# Patient Record
Sex: FEMALE | Race: BLACK OR AFRICAN AMERICAN | Employment: FULL TIME | URBAN - METROPOLITAN AREA
[De-identification: names, ages, dates, MRNs, and addresses within clinical notes are randomized per-mention and may not be internally consistent; named-entity substitution may affect disease eponyms.]

---

## 2017-10-31 ENCOUNTER — HOSPITAL ENCOUNTER (OUTPATIENT)
Dept: OTHER | Age: 42
Discharge: HOME | End: 2017-10-31
Attending: PREVENTIVE MEDICINE | Admitting: PREVENTIVE MEDICINE

## 2017-11-02 LAB
NIL (NEGATIVE) SPOT CONTROL: 1
PANEL A SPOT COUNT: 0
PANEL B SPOT COUNT: 0
POSITIVE CONTROL SPOT COUNT: >20
RUBEOLA (MEASLES) AB IGG: 27
TB CELL IMMUNE MEASURE: NEGATIVE

## 2018-04-18 ENCOUNTER — OFFICE VISIT (OUTPATIENT)
Dept: FAMILY MEDICINE CLINIC | Age: 43
End: 2018-04-18

## 2018-04-18 VITALS
DIASTOLIC BLOOD PRESSURE: 64 MMHG | HEIGHT: 62 IN | BODY MASS INDEX: 43.84 KG/M2 | WEIGHT: 238.2 LBS | HEART RATE: 98 BPM | SYSTOLIC BLOOD PRESSURE: 110 MMHG

## 2018-04-18 DIAGNOSIS — E03.9 HYPOTHYROIDISM, UNSPECIFIED TYPE: Primary | ICD-10-CM

## 2018-04-18 DIAGNOSIS — R53.83 FATIGUE, UNSPECIFIED TYPE: ICD-10-CM

## 2018-04-18 DIAGNOSIS — Z13.220 LIPID SCREENING: ICD-10-CM

## 2018-04-18 LAB
BASOPHILS ABSOLUTE: 0 K/UL (ref 0–0.2)
BASOPHILS RELATIVE PERCENT: 0.8 %
EOSINOPHILS ABSOLUTE: 0.1 K/UL (ref 0–0.6)
EOSINOPHILS RELATIVE PERCENT: 2.2 %
HCT VFR BLD CALC: 34.3 % (ref 36–48)
HEMOGLOBIN: 11.1 G/DL (ref 12–16)
LYMPHOCYTES ABSOLUTE: 1.8 K/UL (ref 1–5.1)
LYMPHOCYTES RELATIVE PERCENT: 44.1 %
MCH RBC QN AUTO: 25.4 PG (ref 26–34)
MCHC RBC AUTO-ENTMCNC: 32.3 G/DL (ref 31–36)
MCV RBC AUTO: 78.6 FL (ref 80–100)
MONOCYTES ABSOLUTE: 0.3 K/UL (ref 0–1.3)
MONOCYTES RELATIVE PERCENT: 7.8 %
NEUTROPHILS ABSOLUTE: 1.9 K/UL (ref 1.7–7.7)
NEUTROPHILS RELATIVE PERCENT: 45.1 %
PDW BLD-RTO: 15.5 % (ref 12.4–15.4)
PLATELET # BLD: 212 K/UL (ref 135–450)
PMV BLD AUTO: 8.9 FL (ref 5–10.5)
RBC # BLD: 4.37 M/UL (ref 4–5.2)
T4 FREE: 0.3 NG/DL (ref 0.9–1.8)
TSH SERPL DL<=0.05 MIU/L-ACNC: 33.01 UIU/ML (ref 0.27–4.2)
WBC # BLD: 4.1 K/UL (ref 4–11)

## 2018-04-18 PROCEDURE — 99202 OFFICE O/P NEW SF 15 MIN: CPT | Performed by: NURSE PRACTITIONER

## 2018-04-18 ASSESSMENT — ENCOUNTER SYMPTOMS
VOMITING: 0
RESPIRATORY NEGATIVE: 1
CONSTIPATION: 1
ABDOMINAL PAIN: 1
NAUSEA: 0
BACK PAIN: 1
DIARRHEA: 0

## 2018-04-18 ASSESSMENT — PATIENT HEALTH QUESTIONNAIRE - PHQ9
SUM OF ALL RESPONSES TO PHQ QUESTIONS 1-9: 2
1. LITTLE INTEREST OR PLEASURE IN DOING THINGS: 1
SUM OF ALL RESPONSES TO PHQ9 QUESTIONS 1 & 2: 2
2. FEELING DOWN, DEPRESSED OR HOPELESS: 1

## 2018-04-19 ENCOUNTER — TELEPHONE (OUTPATIENT)
Dept: INTERNAL MEDICINE CLINIC | Age: 43
End: 2018-04-19

## 2018-04-19 LAB
A/G RATIO: 1.4 (ref 1.1–2.2)
ALBUMIN SERPL-MCNC: 4.4 G/DL (ref 3.4–5)
ALP BLD-CCNC: 53 U/L (ref 40–129)
ALT SERPL-CCNC: 12 U/L (ref 10–40)
ANION GAP SERPL CALCULATED.3IONS-SCNC: 18 MMOL/L (ref 3–16)
AST SERPL-CCNC: 23 U/L (ref 15–37)
BILIRUB SERPL-MCNC: 0.3 MG/DL (ref 0–1)
BUN BLDV-MCNC: 14 MG/DL (ref 7–20)
CALCIUM SERPL-MCNC: 8.8 MG/DL (ref 8.3–10.6)
CHLORIDE BLD-SCNC: 99 MMOL/L (ref 99–110)
CO2: 23 MMOL/L (ref 21–32)
CREAT SERPL-MCNC: 1 MG/DL (ref 0.6–1.1)
GFR AFRICAN AMERICAN: >60
GFR NON-AFRICAN AMERICAN: >60
GLOBULIN: 3.2 G/DL
GLUCOSE BLD-MCNC: 75 MG/DL (ref 70–99)
POTASSIUM SERPL-SCNC: 4.2 MMOL/L (ref 3.5–5.1)
SODIUM BLD-SCNC: 140 MMOL/L (ref 136–145)
TOTAL PROTEIN: 7.6 G/DL (ref 6.4–8.2)

## 2018-04-19 RX ORDER — LIOTHYRONINE SODIUM 25 UG/1
25 TABLET ORAL DAILY
Qty: 30 TABLET | Refills: 1 | Status: SHIPPED | OUTPATIENT
Start: 2018-04-19 | End: 2018-05-07 | Stop reason: SDUPTHER

## 2018-05-07 ENCOUNTER — OFFICE VISIT (OUTPATIENT)
Dept: FAMILY MEDICINE CLINIC | Age: 43
End: 2018-05-07

## 2018-05-07 VITALS
SYSTOLIC BLOOD PRESSURE: 116 MMHG | HEIGHT: 62 IN | DIASTOLIC BLOOD PRESSURE: 78 MMHG | OXYGEN SATURATION: 98 % | BODY MASS INDEX: 42.51 KG/M2 | HEART RATE: 83 BPM | WEIGHT: 231 LBS

## 2018-05-07 DIAGNOSIS — D50.9 IRON DEFICIENCY ANEMIA, UNSPECIFIED IRON DEFICIENCY ANEMIA TYPE: ICD-10-CM

## 2018-05-07 DIAGNOSIS — E03.9 ACQUIRED HYPOTHYROIDISM: Primary | ICD-10-CM

## 2018-05-07 PROCEDURE — 99214 OFFICE O/P EST MOD 30 MIN: CPT | Performed by: FAMILY MEDICINE

## 2018-05-07 RX ORDER — LIOTHYRONINE SODIUM 50 UG/1
50 TABLET ORAL DAILY
Qty: 1 TABLET | Refills: 5 | Status: SHIPPED | OUTPATIENT
Start: 2018-05-07 | End: 2018-05-11 | Stop reason: SDUPTHER

## 2018-05-11 DIAGNOSIS — E03.9 ACQUIRED HYPOTHYROIDISM: ICD-10-CM

## 2018-05-11 RX ORDER — LIOTHYRONINE SODIUM 50 UG/1
50 TABLET ORAL DAILY
Qty: 30 TABLET | Refills: 5 | Status: SHIPPED | OUTPATIENT
Start: 2018-05-11 | End: 2018-08-22 | Stop reason: SDUPTHER

## 2018-05-12 ASSESSMENT — ENCOUNTER SYMPTOMS
STRIDOR: 0
TROUBLE SWALLOWING: 0
SHORTNESS OF BREATH: 0
ABDOMINAL PAIN: 0
EYE REDNESS: 0
BLOOD IN STOOL: 0
VOMITING: 0
SORE THROAT: 0
NAUSEA: 0
APNEA: 0
EYE ITCHING: 0
COUGH: 0
WHEEZING: 0

## 2018-05-15 ENCOUNTER — TELEPHONE (OUTPATIENT)
Dept: FAMILY MEDICINE CLINIC | Age: 43
End: 2018-05-15

## 2018-05-15 DIAGNOSIS — E03.9 ACQUIRED HYPOTHYROIDISM: Primary | ICD-10-CM

## 2018-08-09 ENCOUNTER — NURSE ONLY (OUTPATIENT)
Dept: FAMILY MEDICINE CLINIC | Age: 43
End: 2018-08-09

## 2018-08-09 DIAGNOSIS — E03.9 ACQUIRED HYPOTHYROIDISM: ICD-10-CM

## 2018-08-09 DIAGNOSIS — Z13.220 LIPID SCREENING: ICD-10-CM

## 2018-08-09 DIAGNOSIS — E03.1 CONGENITAL HYPOTHYROIDISM WITHOUT GOITER: ICD-10-CM

## 2018-08-09 DIAGNOSIS — Z86.39 HX OF GRAVES' DISEASE: ICD-10-CM

## 2018-08-09 LAB
CHOLESTEROL, TOTAL: 181 MG/DL (ref 0–199)
HDLC SERPL-MCNC: 49 MG/DL (ref 40–60)
LDL CHOLESTEROL CALCULATED: 96 MG/DL
T4 FREE: <0.1 NG/DL (ref 0.9–1.8)
TRIGL SERPL-MCNC: 178 MG/DL (ref 0–150)
TSH SERPL DL<=0.05 MIU/L-ACNC: 0.12 UIU/ML (ref 0.27–4.2)
VLDLC SERPL CALC-MCNC: 36 MG/DL

## 2018-08-09 PROCEDURE — 36415 COLL VENOUS BLD VENIPUNCTURE: CPT | Performed by: FAMILY MEDICINE

## 2018-08-22 ENCOUNTER — OFFICE VISIT (OUTPATIENT)
Dept: FAMILY MEDICINE CLINIC | Age: 43
End: 2018-08-22

## 2018-08-22 VITALS
DIASTOLIC BLOOD PRESSURE: 70 MMHG | BODY MASS INDEX: 43.17 KG/M2 | HEART RATE: 78 BPM | SYSTOLIC BLOOD PRESSURE: 102 MMHG | OXYGEN SATURATION: 96 % | WEIGHT: 234.6 LBS | HEIGHT: 62 IN

## 2018-08-22 DIAGNOSIS — E03.9 ACQUIRED HYPOTHYROIDISM: Primary | ICD-10-CM

## 2018-08-22 DIAGNOSIS — R10.12 LEFT UPPER QUADRANT PAIN: ICD-10-CM

## 2018-08-22 DIAGNOSIS — K21.9 GASTROESOPHAGEAL REFLUX DISEASE WITHOUT ESOPHAGITIS: ICD-10-CM

## 2018-08-22 DIAGNOSIS — R63.5 WEIGHT GAIN: ICD-10-CM

## 2018-08-22 PROCEDURE — 99214 OFFICE O/P EST MOD 30 MIN: CPT | Performed by: FAMILY MEDICINE

## 2018-08-22 RX ORDER — OMEPRAZOLE 20 MG/1
20 CAPSULE, DELAYED RELEASE ORAL 2 TIMES DAILY
Qty: 30 CAPSULE | Refills: 3 | Status: SHIPPED | OUTPATIENT
Start: 2018-08-22 | End: 2019-04-09 | Stop reason: SDUPTHER

## 2018-08-22 RX ORDER — PHENTERMINE HYDROCHLORIDE 37.5 MG/1
37.5 TABLET ORAL
Qty: 30 TABLET | Refills: 0 | Status: SHIPPED | OUTPATIENT
Start: 2018-08-22 | End: 2018-09-21

## 2018-08-22 RX ORDER — LIOTHYRONINE SODIUM 50 UG/1
50 TABLET ORAL DAILY
Qty: 30 TABLET | Refills: 5 | Status: SHIPPED | OUTPATIENT
Start: 2018-08-22 | End: 2019-04-09

## 2018-08-22 ASSESSMENT — ENCOUNTER SYMPTOMS
FLATUS: 0
CONSTIPATION: 1
VOMITING: 0
ABDOMINAL PAIN: 1
DIARRHEA: 0
NAUSEA: 0

## 2018-08-22 NOTE — PROGRESS NOTES
Eliud Banerjee  17    Chief Complaint   Patient presents with    3 Month Follow-Up     talk about weight loss options. needs referral for gastro    Hypothyroidism    Gastroesophageal Reflux    Medication Refill    Other           Patient here for 3 months f/u regarding hypothyroidism, GERD, chronic abdominal pain medications refill, and also c/o abdominal pain, and she would like to try medication for wt loss. Abdominal Pain   This is a chronic problem. The current episode started more than 1 year ago. The onset quality is gradual. The problem occurs daily. The problem has been waxing and waning. The pain is located in the LUQ. The pain is at a severity of 6/10. The quality of the pain is aching. The abdominal pain radiates to the LUQ and back. Associated symptoms include constipation. Pertinent negatives include no anorexia, belching, diarrhea, dysuria, fever, flatus, frequency, headaches, hematuria, melena, nausea, vomiting or weight loss. Nothing aggravates the pain. The pain is relieved by eating (certai types of food). She has tried H2 blockers and proton pump inhibitors for the symptoms. The treatment provided mild relief. Her past medical history is significant for GERD. There is no history of Crohn's disease, gallstones, irritable bowel syndrome, PUD or ulcerative colitis.        Past Medical History:   Diagnosis Date    Endometriosis     Endometriosis of pelvis     Goiter 2001    Grave's disease     Pelvic inflammatory disease (PID) 1992    Thyroid disease hypothyroid     Past Surgical History:   Procedure Laterality Date     SECTION      ENDOMETRIAL ABLATION      ENDOMETRIAL ABLATION      TUBAL LIGATION       Family History   Problem Relation Age of Onset    High Blood Pressure Mother     Substance Abuse Mother     Depression Mother     Substance Abuse Father      Social History     Social History    Marital status:      Spouse name: N/A  Number of children: N/A    Years of education: N/A     Occupational History    Not on file. Social History Main Topics    Smoking status: Former Smoker     Packs/day: 0.25     Years: 18.00     Types: Cigarettes    Smokeless tobacco: Never Used    Alcohol use Yes      Comment: 3 x a week    Drug use: No    Sexual activity: Yes     Partners: Male     Other Topics Concern    Not on file     Social History Narrative    No narrative on file       No Known Allergies  Current Outpatient Prescriptions   Medication Sig Dispense Refill    phentermine (ADIPEX-P) 37.5 MG tablet Take 1 tablet by mouth every morning (before breakfast) for 30 days. . 30 tablet 0    liothyronine (CYTOMEL) 50 MCG tablet Take 1 tablet by mouth daily 30 tablet 5    omeprazole (PRILOSEC) 20 MG delayed release capsule Take 1 capsule by mouth 2 times daily 30 capsule 3     No current facility-administered medications for this visit. Review of Systems   Constitutional: Negative for activity change, appetite change, chills, fever and weight loss. Respiratory: Negative for cough and shortness of breath. Cardiovascular: Negative for chest pain and leg swelling. Gastrointestinal: Positive for abdominal pain and constipation. Negative for anorexia, blood in stool, diarrhea, flatus, melena, nausea and vomiting. Genitourinary: Negative for dysuria, frequency and hematuria. Skin: Negative for rash. Neurological: Negative for dizziness, numbness and headaches. Psychiatric/Behavioral: Negative for agitation and sleep disturbance. The patient is not nervous/anxious.         Lab Results   Component Value Date    WBC 4.1 04/18/2018    HGB 11.1 (L) 04/18/2018    HCT 34.3 (L) 04/18/2018    MCV 78.6 (L) 04/18/2018     04/18/2018     Lab Results   Component Value Date     04/18/2018    K 4.2 04/18/2018    CL 99 04/18/2018    CO2 23 04/18/2018    BUN 14 04/18/2018    CREATININE 1.0 04/18/2018    GLUCOSE 75 04/18/2018 reviewed showed both the TSH and free T4 are low so will send her to the Endo  - External Referral To Endocrinology  - liothyronine (CYTOMEL) 50 MCG tablet; Take 1 tablet by mouth daily  Dispense: 30 tablet; Refill: 5    2. Gastroesophageal reflux disease without esophagitis  - Stable, on medication    3. Left upper quadrant pain  - CT ABDOMEN PELVIS W WO CONTRAST Additional Contrast? Oral; Future    4. Weight gain  - Start:  - phentermine (ADIPEX-P) 37.5 MG tablet; Take 1 tablet by mouth every morning (before breakfast) for 30 days. .  Dispense: 30 tablet; Refill: 0                - Appropriate prescription are addressed. - After visit summery provided. - Questions answered and patient verbalizes understanding.  - Call for any problem, questions, or concerns.  - RTC if symptoms worse. Return in about 1 month (around 9/22/2018).

## 2018-09-03 PROBLEM — K21.9 GASTROESOPHAGEAL REFLUX DISEASE WITHOUT ESOPHAGITIS: Status: ACTIVE | Noted: 2018-09-03

## 2018-09-03 ASSESSMENT — ENCOUNTER SYMPTOMS
SHORTNESS OF BREATH: 0
COUGH: 0
BELCHING: 0
BLOOD IN STOOL: 0

## 2018-09-03 ASSESSMENT — CROHNS DISEASE ACTIVITY INDEX (CDAI): CDAI SCORE: 0

## 2018-09-07 ENCOUNTER — HOSPITAL ENCOUNTER (OUTPATIENT)
Dept: CT IMAGING | Age: 43
Discharge: HOME | End: 2018-09-07
Attending: FAMILY MEDICINE | Admitting: FAMILY MEDICINE

## 2018-09-07 DIAGNOSIS — R10.12 LEFT UPPER QUADRANT PAIN: ICD-10-CM

## 2018-09-25 ENCOUNTER — OFFICE VISIT (OUTPATIENT)
Dept: FAMILY MEDICINE CLINIC | Age: 43
End: 2018-09-25
Payer: COMMERCIAL

## 2018-09-25 VITALS
OXYGEN SATURATION: 99 % | DIASTOLIC BLOOD PRESSURE: 82 MMHG | WEIGHT: 237.6 LBS | HEART RATE: 81 BPM | SYSTOLIC BLOOD PRESSURE: 124 MMHG | BODY MASS INDEX: 43.46 KG/M2

## 2018-09-25 DIAGNOSIS — R10.12 LEFT UPPER QUADRANT PAIN: Primary | ICD-10-CM

## 2018-09-25 DIAGNOSIS — E03.9 ACQUIRED HYPOTHYROIDISM: ICD-10-CM

## 2018-09-25 DIAGNOSIS — R63.5 WEIGHT GAIN: ICD-10-CM

## 2018-09-25 DIAGNOSIS — K21.9 GASTROESOPHAGEAL REFLUX DISEASE WITHOUT ESOPHAGITIS: ICD-10-CM

## 2018-09-25 PROCEDURE — 99213 OFFICE O/P EST LOW 20 MIN: CPT | Performed by: FAMILY MEDICINE

## 2018-09-25 RX ORDER — PHENTERMINE HYDROCHLORIDE 37.5 MG/1
37.5 TABLET ORAL
COMMUNITY
End: 2018-10-18 | Stop reason: SDUPTHER

## 2018-09-25 ASSESSMENT — ENCOUNTER SYMPTOMS
SHORTNESS OF BREATH: 0
COUGH: 0

## 2018-09-25 ASSESSMENT — PATIENT HEALTH QUESTIONNAIRE - PHQ9
SUM OF ALL RESPONSES TO PHQ QUESTIONS 1-9: 1
2. FEELING DOWN, DEPRESSED OR HOPELESS: 1
1. LITTLE INTEREST OR PLEASURE IN DOING THINGS: 0
SUM OF ALL RESPONSES TO PHQ QUESTIONS 1-9: 1
SUM OF ALL RESPONSES TO PHQ9 QUESTIONS 1 & 2: 1

## 2018-09-25 NOTE — PROGRESS NOTES
breath. Cardiovascular: Negative for chest pain and leg swelling. Skin: Negative for rash. Neurological: Negative for dizziness and headaches. Psychiatric/Behavioral: Negative for agitation and sleep disturbance. The patient is not nervous/anxious. Lab Results   Component Value Date    WBC 4.1 04/18/2018    HGB 11.1 (L) 04/18/2018    HCT 34.3 (L) 04/18/2018    MCV 78.6 (L) 04/18/2018     04/18/2018     Lab Results   Component Value Date     04/18/2018    K 4.2 04/18/2018    CL 99 04/18/2018    CO2 23 04/18/2018    BUN 14 04/18/2018    CREATININE 1.0 04/18/2018    GLUCOSE 75 04/18/2018    CALCIUM 8.8 04/18/2018    PROT 7.6 04/18/2018    LABALBU 4.4 04/18/2018    BILITOT 0.3 04/18/2018    ALKPHOS 53 04/18/2018    AST 23 04/18/2018    ALT 12 04/18/2018    LABGLOM >60 04/18/2018    GFRAA >60 04/18/2018    AGRATIO 1.4 04/18/2018    GLOB 3.2 04/18/2018     Lab Results   Component Value Date    CHOL 181 08/09/2018     Lab Results   Component Value Date    TRIG 178 (H) 08/09/2018     Lab Results   Component Value Date    HDL 49 08/09/2018     Lab Results   Component Value Date    LDLCALC 96 08/09/2018     No results found for: LABA1C  Lab Results   Component Value Date    TSH 0.12 (L) 08/09/2018         /82 (Site: Left Upper Arm, Position: Sitting, Cuff Size: Medium Adult)   Pulse 81   Wt 237 lb 9.6 oz (107.8 kg)   SpO2 99%   BMI 43.46 kg/m²     BP Readings from Last 3 Encounters:   09/25/18 124/82   08/22/18 102/70   05/07/18 116/78       Wt Readings from Last 3 Encounters:   09/25/18 237 lb 9.6 oz (107.8 kg)   08/22/18 234 lb 9.6 oz (106.4 kg)   05/07/18 231 lb (104.8 kg)         Physical Exam   Constitutional: She is oriented to person, place, and time. She appears well-developed and well-nourished. No distress. HENT:   Head: Normocephalic and atraumatic. Cardiovascular: Normal rate, regular rhythm and normal heart sounds. No murmur heard.   Pulmonary/Chest: Effort normal and

## 2018-10-18 DIAGNOSIS — R63.5 WEIGHT GAIN: Primary | ICD-10-CM

## 2018-10-18 RX ORDER — PHENTERMINE HYDROCHLORIDE 37.5 MG/1
37.5 TABLET ORAL
Qty: 30 TABLET | Refills: 0 | Status: SHIPPED | OUTPATIENT
Start: 2018-10-18 | End: 2018-10-24 | Stop reason: SDUPTHER

## 2018-10-23 ENCOUNTER — OFFICE VISIT (OUTPATIENT)
Dept: FAMILY MEDICINE CLINIC | Age: 43
End: 2018-10-23
Payer: COMMERCIAL

## 2018-10-23 VITALS
OXYGEN SATURATION: 98 % | SYSTOLIC BLOOD PRESSURE: 128 MMHG | DIASTOLIC BLOOD PRESSURE: 90 MMHG | WEIGHT: 234 LBS | BODY MASS INDEX: 42.8 KG/M2 | HEART RATE: 90 BPM

## 2018-10-23 DIAGNOSIS — K21.9 GASTROESOPHAGEAL REFLUX DISEASE WITHOUT ESOPHAGITIS: ICD-10-CM

## 2018-10-23 DIAGNOSIS — R63.5 WEIGHT GAIN: Primary | ICD-10-CM

## 2018-10-23 DIAGNOSIS — E03.9 ACQUIRED HYPOTHYROIDISM: ICD-10-CM

## 2018-10-23 PROCEDURE — 99213 OFFICE O/P EST LOW 20 MIN: CPT | Performed by: FAMILY MEDICINE

## 2018-10-23 NOTE — PROGRESS NOTES
cough and shortness of breath. Cardiovascular: Negative for chest pain and leg swelling. Neurological: Negative for dizziness and headaches. Psychiatric/Behavioral: Negative for agitation and sleep disturbance. The patient is not nervous/anxious. Lab Results   Component Value Date    WBC 4.1 04/18/2018    HGB 11.1 (L) 04/18/2018    HCT 34.3 (L) 04/18/2018    MCV 78.6 (L) 04/18/2018     04/18/2018     Lab Results   Component Value Date     04/18/2018    K 4.2 04/18/2018    CL 99 04/18/2018    CO2 23 04/18/2018    BUN 14 04/18/2018    CREATININE 1.0 04/18/2018    GLUCOSE 75 04/18/2018    CALCIUM 8.8 04/18/2018    PROT 7.6 04/18/2018    LABALBU 4.4 04/18/2018    BILITOT 0.3 04/18/2018    ALKPHOS 53 04/18/2018    AST 23 04/18/2018    ALT 12 04/18/2018    LABGLOM >60 04/18/2018    GFRAA >60 04/18/2018    AGRATIO 1.4 04/18/2018    GLOB 3.2 04/18/2018     Lab Results   Component Value Date    CHOL 181 08/09/2018     Lab Results   Component Value Date    TRIG 178 (H) 08/09/2018     Lab Results   Component Value Date    HDL 49 08/09/2018     Lab Results   Component Value Date    LDLCALC 96 08/09/2018     No results found for: LABA1C  Lab Results   Component Value Date    TSH 0.12 (L) 08/09/2018         BP (!) 128/90 (Site: Left Upper Arm, Position: Sitting, Cuff Size: Large Adult)   Pulse 90   Wt 234 lb (106.1 kg)   SpO2 98%   BMI 42.80 kg/m²     BP Readings from Last 3 Encounters:   10/23/18 (!) 128/90   09/25/18 124/82   08/22/18 102/70       Wt Readings from Last 3 Encounters:   10/23/18 234 lb (106.1 kg)   09/25/18 237 lb 9.6 oz (107.8 kg)   08/22/18 234 lb 9.6 oz (106.4 kg)         Physical Exam   Constitutional: She is oriented to person, place, and time. She appears well-developed and well-nourished. No distress. HENT:   Head: Normocephalic and atraumatic. Cardiovascular: Normal rate, regular rhythm and normal heart sounds. No murmur heard.   Pulmonary/Chest: Effort normal and

## 2018-10-24 DIAGNOSIS — R63.5 WEIGHT GAIN: ICD-10-CM

## 2018-10-25 RX ORDER — PHENTERMINE HYDROCHLORIDE 37.5 MG/1
37.5 TABLET ORAL
Qty: 30 TABLET | Refills: 0 | Status: SHIPPED | OUTPATIENT
Start: 2018-10-25 | End: 2018-11-24

## 2018-10-28 ASSESSMENT — ENCOUNTER SYMPTOMS
COUGH: 0
SHORTNESS OF BREATH: 0

## 2019-04-09 ENCOUNTER — OFFICE VISIT (OUTPATIENT)
Dept: FAMILY MEDICINE CLINIC | Age: 44
End: 2019-04-09
Payer: COMMERCIAL

## 2019-04-09 VITALS
BODY MASS INDEX: 42.25 KG/M2 | WEIGHT: 231 LBS | OXYGEN SATURATION: 99 % | SYSTOLIC BLOOD PRESSURE: 112 MMHG | HEART RATE: 74 BPM | DIASTOLIC BLOOD PRESSURE: 84 MMHG

## 2019-04-09 DIAGNOSIS — K21.9 GASTROESOPHAGEAL REFLUX DISEASE WITHOUT ESOPHAGITIS: ICD-10-CM

## 2019-04-09 DIAGNOSIS — E03.9 ACQUIRED HYPOTHYROIDISM: ICD-10-CM

## 2019-04-09 PROCEDURE — 99213 OFFICE O/P EST LOW 20 MIN: CPT | Performed by: FAMILY MEDICINE

## 2019-04-09 RX ORDER — LEVOTHYROXINE SODIUM 0.12 MG/1
125 TABLET ORAL DAILY
COMMUNITY
End: 2020-02-28 | Stop reason: SDUPTHER

## 2019-04-09 RX ORDER — OMEPRAZOLE 20 MG/1
20 CAPSULE, DELAYED RELEASE ORAL 2 TIMES DAILY
Qty: 30 CAPSULE | Refills: 5 | Status: SHIPPED | OUTPATIENT
Start: 2019-04-09 | End: 2019-04-11 | Stop reason: SDUPTHER

## 2019-04-09 ASSESSMENT — PATIENT HEALTH QUESTIONNAIRE - PHQ9
SUM OF ALL RESPONSES TO PHQ QUESTIONS 1-9: 0
SUM OF ALL RESPONSES TO PHQ QUESTIONS 1-9: 0
SUM OF ALL RESPONSES TO PHQ9 QUESTIONS 1 & 2: 0
1. LITTLE INTEREST OR PLEASURE IN DOING THINGS: 0
2. FEELING DOWN, DEPRESSED OR HOPELESS: 0

## 2019-04-09 ASSESSMENT — ENCOUNTER SYMPTOMS
SHORTNESS OF BREATH: 0
COUGH: 0

## 2019-04-09 NOTE — PROGRESS NOTES
Perla Delvalle  18    Chief Complaint   Patient presents with    Medication Refill           Patient here for medication refill for GERD, patient also f/u with the endocrinology for her hypothyroidism, patient doing fine and has no complaints.       Past Medical History:   Diagnosis Date    Endometriosis     Endometriosis of pelvis 2011    Goiter 2001    Grave's disease 2000    Pelvic inflammatory disease (PID) 1992    Thyroid disease hypothyroid     Past Surgical History:   Procedure Laterality Date     SECTION      ENDOMETRIAL ABLATION      ENDOMETRIAL ABLATION      TUBAL LIGATION       Family History   Problem Relation Age of Onset    High Blood Pressure Mother     Substance Abuse Mother     Depression Mother     Substance Abuse Father      Social History     Socioeconomic History    Marital status:      Spouse name: Not on file    Number of children: Not on file    Years of education: Not on file    Highest education level: Not on file   Occupational History    Not on file   Social Needs    Financial resource strain: Not on file    Food insecurity:     Worry: Not on file     Inability: Not on file    Transportation needs:     Medical: Not on file     Non-medical: Not on file   Tobacco Use    Smoking status: Former Smoker     Packs/day: 0.25     Years: 18.00     Pack years: 4.50     Types: Cigarettes    Smokeless tobacco: Never Used   Substance and Sexual Activity    Alcohol use: Yes     Comment: 3 x a week    Drug use: No    Sexual activity: Yes     Partners: Male   Lifestyle    Physical activity:     Days per week: Not on file     Minutes per session: Not on file    Stress: Not on file   Relationships    Social connections:     Talks on phone: Not on file     Gets together: Not on file     Attends Scientologist service: Not on file     Active member of club or organization: Not on file     Attends meetings of clubs or organizations: Not on file Relationship status: Not on file    Intimate partner violence:     Fear of current or ex partner: Not on file     Emotionally abused: Not on file     Physically abused: Not on file     Forced sexual activity: Not on file   Other Topics Concern    Not on file   Social History Narrative    Not on file       No Known Allergies  Current Outpatient Medications   Medication Sig Dispense Refill    omeprazole (PRILOSEC) 20 MG delayed release capsule Take 1 capsule by mouth 2 times daily 30 capsule 5    Liothyronine Sodium (CYTOMEL PO) Take 10 mg by mouth      levothyroxine (SYNTHROID) 125 MCG tablet Take 125 mcg by mouth Daily       No current facility-administered medications for this visit. Review of Systems   Constitutional: Negative for activity change, chills and fever. Respiratory: Negative for cough and shortness of breath. Cardiovascular: Negative for chest pain and leg swelling. Neurological: Negative for dizziness and headaches. Psychiatric/Behavioral: Negative for agitation. The patient is not nervous/anxious.         Lab Results   Component Value Date    WBC 4.1 04/18/2018    HGB 11.1 (L) 04/18/2018    HCT 34.3 (L) 04/18/2018    MCV 78.6 (L) 04/18/2018     04/18/2018     Lab Results   Component Value Date     04/18/2018    K 4.2 04/18/2018    CL 99 04/18/2018    CO2 23 04/18/2018    BUN 14 04/18/2018    CREATININE 1.0 04/18/2018    GLUCOSE 75 04/18/2018    CALCIUM 8.8 04/18/2018    PROT 7.6 04/18/2018    LABALBU 4.4 04/18/2018    BILITOT 0.3 04/18/2018    ALKPHOS 53 04/18/2018    AST 23 04/18/2018    ALT 12 04/18/2018    LABGLOM >60 04/18/2018    GFRAA >60 04/18/2018    AGRATIO 1.4 04/18/2018    GLOB 3.2 04/18/2018     Lab Results   Component Value Date    CHOL 181 08/09/2018     Lab Results   Component Value Date    TRIG 178 (H) 08/09/2018     Lab Results   Component Value Date    HDL 49 08/09/2018     Lab Results   Component Value Date    LDLCALC 96 08/09/2018     No results found for: LABA1C  Lab Results   Component Value Date    TSH 0.12 (L) 08/09/2018         /84 (Site: Left Upper Arm, Position: Sitting, Cuff Size: Large Adult)   Pulse 74   Wt 231 lb (104.8 kg)   SpO2 99%   BMI 42.25 kg/m²     BP Readings from Last 3 Encounters:   04/09/19 112/84   10/23/18 (!) 128/90   09/25/18 124/82       Wt Readings from Last 3 Encounters:   04/09/19 231 lb (104.8 kg)   10/23/18 234 lb (106.1 kg)   09/25/18 237 lb 9.6 oz (107.8 kg)         Physical Exam   Constitutional: She is oriented to person, place, and time. She appears well-developed and well-nourished. No distress. HENT:   Head: Normocephalic and atraumatic. Eyes: Pupils are equal, round, and reactive to light. EOM are normal.   Cardiovascular: Normal rate and regular rhythm. No murmur heard. Pulmonary/Chest: Effort normal and breath sounds normal. She has no wheezes. She has no rales. Abdominal: Soft. Bowel sounds are normal.   Neurological: She is alert and oriented to person, place, and time. Skin: She is not diaphoretic. Psychiatric: She has a normal mood and affect. Her behavior is normal.       ASSESSMENT/ PLAN:    1. Gastroesophageal reflux disease without esophagitis  - stable  - omeprazole (PRILOSEC) 20 MG delayed release capsule; Take 1 capsule by mouth 2 times daily  Dispense: 30 capsule; Refill: 5    2. Acquired hypothyroidism  - f/u with the endocrinology                - Appropriateprescription are addressed. - After visit summery provided. - Questions answered and patient verbalizes understanding.  - Call for any problem, questions, or concerns. Return in about 1 year (around 4/9/2020).

## 2019-04-10 ENCOUNTER — TELEPHONE (OUTPATIENT)
Dept: FAMILY MEDICINE CLINIC | Age: 44
End: 2019-04-10

## 2019-04-10 DIAGNOSIS — K21.9 GASTROESOPHAGEAL REFLUX DISEASE WITHOUT ESOPHAGITIS: ICD-10-CM

## 2019-04-10 NOTE — TELEPHONE ENCOUNTER
Patient's insurance will not cover the omperazole 20 mg twice daily they only cover once daily. Patient states taking it once a day is not working.  Please advise

## 2019-04-11 RX ORDER — OMEPRAZOLE 40 MG/1
40 CAPSULE, DELAYED RELEASE ORAL DAILY
Qty: 30 CAPSULE | Refills: 5 | Status: SHIPPED | OUTPATIENT
Start: 2019-04-11 | End: 2020-02-28 | Stop reason: SDUPTHER

## 2020-02-19 ENCOUNTER — OFFICE VISIT (OUTPATIENT)
Dept: BARIATRICS/WEIGHT MGMT | Age: 45
End: 2020-02-19
Payer: COMMERCIAL

## 2020-02-19 VITALS
DIASTOLIC BLOOD PRESSURE: 68 MMHG | BODY MASS INDEX: 45.27 KG/M2 | HEIGHT: 62 IN | WEIGHT: 246 LBS | SYSTOLIC BLOOD PRESSURE: 102 MMHG | OXYGEN SATURATION: 97 % | HEART RATE: 85 BPM

## 2020-02-19 PROCEDURE — 99204 OFFICE O/P NEW MOD 45 MIN: CPT | Performed by: NURSE PRACTITIONER

## 2020-02-19 RX ORDER — TURMERIC 400 MG
1 CAPSULE ORAL DAILY
COMMUNITY
End: 2021-03-12

## 2020-02-19 NOTE — PROGRESS NOTES
History   Problem Relation Age of Onset    High Blood Pressure Mother     Substance Abuse Mother     Depression Mother     Substance Abuse Father        Social History:  Social History     Socioeconomic History    Marital status:      Spouse name: Not on file    Number of children: Not on file    Years of education: Associates    Highest education level: Not on file   Occupational History    Occupation: RN   Social Needs    Financial resource strain: Not on file    Food insecurity:     Worry: Not on file     Inability: Not on file   [employer] needs:     Medical: Not on file     Non-medical: Not on file   Tobacco Use    Smoking status: Former Smoker     Packs/day: 0.25     Years: 18.00     Pack years: 4.50     Types: Cigarettes     Last attempt to quit: 2017     Years since quitting: 3.1    Smokeless tobacco: Never Used   Substance and Sexual Activity    Alcohol use: Yes     Alcohol/week: 5.0 standard drinks     Types: 5 Glasses of wine per week     Comment: 3 x a week    Drug use: No    Sexual activity: Yes     Partners: Male   Lifestyle    Physical activity:     Days per week: Not on file     Minutes per session: Not on file    Stress: Not on file   Relationships    Social connections:     Talks on phone: Not on file     Gets together: Not on file     Attends Congregational service: Not on file     Active member of club or organization: Not on file     Attends meetings of clubs or organizations: Not on file     Relationship status: Not on file    Intimate partner violence:     Fear of current or ex partner: Not on file     Emotionally abused: Not on file     Physically abused: Not on file     Forced sexual activity: Not on file   Other Topics Concern    Not on file   Social History Narrative    Not on file       Review of Systems - History obtained from the patient.     Review of Systems - General ROS: negative for - chills, fever, hot flashes or night sweats  ENT ROS: negative for - headaches, oral lesions, sore throat, vertigo or visual changes  Allergy and Immunology ROS: negative for - hives or nasal congestion  Endocrine ROS: negative for - hair pattern changes, mood swings or polydipsia/polyuria, hx of graves. Respiratory ROS: no cough, shortness of breath, or wheezing  Cardiovascular ROS: no chest pain or dyspnea on exertion  Gastrointestinal ROS: no abdominal pain, change in bowel habits, or black or bloody stools  Genito-Urinary ROS: no dysuria, incontinence, trouble voiding, or hematuria  Musculoskeletal ROS: Negative for joint pain or myalgia  Neurological ROS: negative for - behavioral changes, dizziness, headaches, impaired coordination/balance, numbness/tingling or seizures  Dermatological ROS: negative for - eczema or nail changes  Psychological ROS: negative for - anxiety, depression or suicidal ideation     Objective     Physical Exam   Constitutional: Oriented to person, place, and time and well-developed, well-nourished, and in no distress. No distress. Obese   HENT:   Head: Normocephalic and atraumatic. Eyes: Conjunctivae are normal. Pupils are equal, round, and reactive to light. Neck: Normal range of motion. Neck supple. Cardiovascular: Normal rate, regular rhythm, normal heart sounds and intact distal pulses. No murmur heard. Pulmonary/Chest: Effort normal and breath sounds normal. No respiratory distress. Abdominal: Soft. Bowel sounds are normal. Exhibits no distension. There is no tenderness. Large. Musculoskeletal: Exhibits no edema or tenderness. Lymphadenopathy: Deferred. Neurological: She is alert and oriented to person, place, and time. No cranial nerve deficit. Skin: Skin is warm. She is not diaphoretic. Psychiatric: Mood, memory, affect and judgment normal.     Assessment  and Plan    Obesity with a BMI of Body mass index is 44.99 kg/m². Discussed candidacy for weight loss medication: BMI of >30 at 44.   No history of CAD, seizures, liver or kidney problems, cardiac hx, uncontrolled HTN, hyperthyroidism, MAOI therapy, glaucoma or hx of drug abuse. Study was 1 year long, not studied >46-57 age group. Patient is a good candidate for Qsymia weight loss medication, along with nutrition consult, and detailed diet plan. Patient was given medication handout today and below information was discussed. Discussed possible side effects with patient in length. Most common side effects discussed but not limited to; insomnia, dry mouth, constipation, nervousness, increase in HR and HTN. Patient was informed to call with any of the above side effects or other concerns. D/C if weight loss <5% after 12 weeks on max dose, taper max dose to QOD>1wk to d/c. Patient aware of the medication compliance guidelines below and will be removed from Qsymia if the patient does not comply;   - Must meet minimum monthly with provider.  - Will schedule weekly for first month for weigh in and vital signs.   - Current birth control measures prior tubal  - Urine drug with no past hx of any substance abuse. [place] Controlled Substance Monitoring:    Acute and Chronic Pain Monitoring:   RX Monitoring 2/19/2020   Periodic Controlled Substance Monitoring Possible medication side effects, risk of tolerance/dependence & alternative treatments discussed. ;No signs of potential drug abuse or diversion identified. Last RX 2018 adipex. - Any concerns of ETOH or drug abuse must d/c.   - Patient aware, Needs to show at least monthly weight progress with weigh ins. Plan    1. Hx of Graves' disease  - Cytomel and synthroid. States well controlled. - Need records. - Vitamin D 25 Hydroxy; Future  - Vitamin B12 & Folate; Future  - TSH without Reflex; Future  - Comprehensive Metabolic Panel; Future  - CBC Auto Differential; Future  - Lipid Panel; Future  - Urine Drug Screen; Future  - EKG 12 Lead; Future    2. Morbid obesity due to excess calories ([address]  - See below.

## 2020-02-20 ENCOUNTER — HOSPITAL ENCOUNTER (OUTPATIENT)
Age: 45
Discharge: HOME | End: 2020-02-20
Payer: COMMERCIAL

## 2020-02-20 LAB
ALBUMIN SERPL-MCNC: 4.4 GM/DL (ref 3.4–5)
ALP BLD-CCNC: 85 IU/L (ref 40–129)
ALT SERPL-CCNC: 17 U/L (ref 10–40)
AMPHETAMINES: NEGATIVE
ANION GAP SERPL CALCULATED.3IONS-SCNC: 14 MMOL/L (ref 4–16)
AST SERPL-CCNC: 23 IU/L (ref 15–37)
BARBITURATE SCREEN URINE: NEGATIVE
BASOPHILS ABSOLUTE: 0 K/CU MM
BASOPHILS RELATIVE PERCENT: 0.4 % (ref 0–1)
BENZODIAZEPINE SCREEN, URINE: NEGATIVE
BILIRUB SERPL-MCNC: 0.2 MG/DL (ref 0–1)
BUN BLDV-MCNC: 15 MG/DL (ref 6–23)
CALCIUM SERPL-MCNC: 9.7 MG/DL (ref 8.3–10.6)
CANNABINOID SCREEN URINE: NEGATIVE
CHLORIDE BLD-SCNC: 94 MMOL/L (ref 99–110)
CHOLESTEROL: 232 MG/DL
CO2: 26 MMOL/L (ref 21–32)
COCAINE METABOLITE: NEGATIVE
CREAT SERPL-MCNC: 1.1 MG/DL (ref 0.6–1.1)
DIFFERENTIAL TYPE: ABNORMAL
EOSINOPHILS ABSOLUTE: 0.1 K/CU MM
EOSINOPHILS RELATIVE PERCENT: 1.3 % (ref 0–3)
FOLATE: 10.7 NG/ML (ref 3.1–17.5)
GFR AFRICAN AMERICAN: >60 ML/MIN/1.73M2
GFR NON-AFRICAN AMERICAN: 54 ML/MIN/1.73M2
GLUCOSE BLD-MCNC: 88 MG/DL (ref 70–99)
HCT VFR BLD CALC: 42 % (ref 37–47)
HDLC SERPL-MCNC: 56 MG/DL
HEMOGLOBIN: 12.2 GM/DL (ref 12.5–16)
IMMATURE NEUTROPHIL %: 0.3 % (ref 0–0.43)
LDL CHOLESTEROL DIRECT: 156 MG/DL
LYMPHOCYTES ABSOLUTE: 2.4 K/CU MM
LYMPHOCYTES RELATIVE PERCENT: 31 % (ref 24–44)
MCH RBC QN AUTO: 23.8 PG (ref 27–31)
MCHC RBC AUTO-ENTMCNC: 29 % (ref 32–36)
MCV RBC AUTO: 82 FL (ref 78–100)
MONOCYTES ABSOLUTE: 0.6 K/CU MM
MONOCYTES RELATIVE PERCENT: 8.2 % (ref 0–4)
NUCLEATED RBC %: 0 %
OPIATES, URINE: NEGATIVE
OXYCODONE: NORMAL
PDW BLD-RTO: 14.2 % (ref 11.7–14.9)
PHENCYCLIDINE, URINE: NEGATIVE
PLATELET # BLD: 282 K/CU MM (ref 140–440)
PMV BLD AUTO: 11.1 FL (ref 7.5–11.1)
POTASSIUM SERPL-SCNC: 4.1 MMOL/L (ref 3.5–5.1)
RBC # BLD: 5.12 M/CU MM (ref 4.2–5.4)
SEGMENTED NEUTROPHILS ABSOLUTE COUNT: 4.5 K/CU MM
SEGMENTED NEUTROPHILS RELATIVE PERCENT: 58.8 % (ref 36–66)
SODIUM BLD-SCNC: 134 MMOL/L (ref 135–145)
TOTAL IMMATURE NEUTOROPHIL: 0.02 K/CU MM
TOTAL NUCLEATED RBC: 0 K/CU MM
TOTAL PROTEIN: 7.9 GM/DL (ref 6.4–8.2)
TRIGL SERPL-MCNC: 293 MG/DL
TSH HIGH SENSITIVITY: 2.03 UIU/ML (ref 0.27–4.2)
VITAMIN B-12: 525.8 PG/ML (ref 211–911)
VITAMIN D 25-HYDROXY: 23.67 NG/ML
WBC # BLD: 7.7 K/CU MM (ref 4–10.5)

## 2020-02-20 PROCEDURE — 83721 ASSAY OF BLOOD LIPOPROTEIN: CPT

## 2020-02-20 PROCEDURE — 80053 COMPREHEN METABOLIC PANEL: CPT

## 2020-02-20 PROCEDURE — 36415 COLL VENOUS BLD VENIPUNCTURE: CPT

## 2020-02-20 PROCEDURE — 82306 VITAMIN D 25 HYDROXY: CPT

## 2020-02-20 PROCEDURE — 84443 ASSAY THYROID STIM HORMONE: CPT

## 2020-02-20 PROCEDURE — 82607 VITAMIN B-12: CPT

## 2020-02-20 PROCEDURE — 80307 DRUG TEST PRSMV CHEM ANLYZR: CPT

## 2020-02-20 PROCEDURE — 93010 ELECTROCARDIOGRAM REPORT: CPT | Performed by: INTERNAL MEDICINE

## 2020-02-20 PROCEDURE — 85025 COMPLETE CBC W/AUTO DIFF WBC: CPT

## 2020-02-20 PROCEDURE — 93005 ELECTROCARDIOGRAM TRACING: CPT | Performed by: NURSE PRACTITIONER

## 2020-02-20 PROCEDURE — 80061 LIPID PANEL: CPT

## 2020-02-20 PROCEDURE — 82746 ASSAY OF FOLIC ACID SERUM: CPT

## 2020-02-28 ENCOUNTER — OFFICE VISIT (OUTPATIENT)
Dept: FAMILY MEDICINE CLINIC | Age: 45
End: 2020-02-28
Payer: COMMERCIAL

## 2020-02-28 VITALS
DIASTOLIC BLOOD PRESSURE: 70 MMHG | WEIGHT: 244.4 LBS | HEIGHT: 62 IN | RESPIRATION RATE: 14 BRPM | BODY MASS INDEX: 44.98 KG/M2 | SYSTOLIC BLOOD PRESSURE: 124 MMHG | HEART RATE: 80 BPM

## 2020-02-28 PROBLEM — E66.01 OBESITY, CLASS III, BMI 40-49.9 (MORBID OBESITY) (HCC): Status: ACTIVE | Noted: 2020-02-28

## 2020-02-28 PROBLEM — E78.2 MIXED HYPERLIPIDEMIA: Status: ACTIVE | Noted: 2020-02-28

## 2020-02-28 PROCEDURE — 99214 OFFICE O/P EST MOD 30 MIN: CPT | Performed by: FAMILY MEDICINE

## 2020-02-28 RX ORDER — OMEPRAZOLE 40 MG/1
40 CAPSULE, DELAYED RELEASE ORAL DAILY
Qty: 90 CAPSULE | Refills: 3 | Status: SHIPPED | OUTPATIENT
Start: 2020-02-28 | End: 2020-08-28 | Stop reason: SDUPTHER

## 2020-02-28 RX ORDER — LEVOTHYROXINE SODIUM 0.12 MG/1
125 TABLET ORAL DAILY
Qty: 90 TABLET | Refills: 3 | Status: SHIPPED | OUTPATIENT
Start: 2020-02-28 | End: 2020-08-28 | Stop reason: SDUPTHER

## 2020-02-28 RX ORDER — LIOTHYRONINE SODIUM 5 UG/1
5 TABLET ORAL DAILY
Qty: 90 TABLET | Refills: 3 | Status: SHIPPED | OUTPATIENT
Start: 2020-02-28 | End: 2020-08-28 | Stop reason: SDUPTHER

## 2020-02-28 ASSESSMENT — PATIENT HEALTH QUESTIONNAIRE - PHQ9
SUM OF ALL RESPONSES TO PHQ QUESTIONS 1-9: 0
1. LITTLE INTEREST OR PLEASURE IN DOING THINGS: 0
SUM OF ALL RESPONSES TO PHQ QUESTIONS 1-9: 0
SUM OF ALL RESPONSES TO PHQ9 QUESTIONS 1 & 2: 0
2. FEELING DOWN, DEPRESSED OR HOPELESS: 0

## 2020-02-28 ASSESSMENT — ENCOUNTER SYMPTOMS
SHORTNESS OF BREATH: 0
COUGH: 0

## 2020-02-28 NOTE — PROGRESS NOTES
Fawad Pavon  19    Chief Complaint   Patient presents with    Medication Refill    Hypothyroidism    Gastroesophageal Reflux    Hyperlipidemia           Patient here for f/u visit regarding GERD, HLD, and hypothyroidism, patient dose not f/u with the endocrinology any more, needs medication refill, doing fine and her Thyoid under control, and her GERD under control too. Patient not taking any medication for her cholesterol. Past Medical History:   Diagnosis Date    Endometriosis     Endometriosis of pelvis     Goiter     Grave's disease     Pelvic inflammatory disease (PID)     Thyroid disease hypothyroid     Past Surgical History:   Procedure Laterality Date     SECTION      ENDOMETRIAL ABLATION      ENDOMETRIAL ABLATION      TUBAL LIGATION       Family History   Problem Relation Age of Onset    High Blood Pressure Mother     Substance Abuse Mother     Depression Mother     Substance Abuse Father      Social History     Socioeconomic History    Marital status:      Spouse name: Not on file    Number of children: Not on file    Years of education: Associates    Highest education level: Not on file   Occupational History    Occupation: RN   Social Needs    Financial resource strain: Not on file    Food insecurity:     Worry: Not on file     Inability: Not on file   Bgifty needs:     Medical: Not on file     Non-medical: Not on file   Tobacco Use    Smoking status: Former Smoker     Packs/day: 0.25     Years: 18.00     Pack years: 4.50     Types: Cigarettes     Last attempt to quit: 2017     Years since quitting: 3.1    Smokeless tobacco: Never Used   Substance and Sexual Activity    Alcohol use:  Yes     Alcohol/week: 5.0 standard drinks     Types: 5 Glasses of wine per week     Comment: 3 x a week    Drug use: No    Sexual activity: Yes     Partners: Male   Lifestyle    Physical activity:     Days per week: Not on file     Minutes per session: Not on file    Stress: Not on file   Relationships    Social connections:     Talks on phone: Not on file     Gets together: Not on file     Attends Gnosticism service: Not on file     Active member of club or organization: Not on file     Attends meetings of clubs or organizations: Not on file     Relationship status: Not on file    Intimate partner violence:     Fear of current or ex partner: Not on file     Emotionally abused: Not on file     Physically abused: Not on file     Forced sexual activity: Not on file   Other Topics Concern    Not on file   Social History Narrative    Not on file       No Known Allergies  Current Outpatient Medications   Medication Sig Dispense Refill    omeprazole (PRILOSEC) 40 MG delayed release capsule Take 1 capsule by mouth Daily 90 capsule 3    liothyronine (CYTOMEL) 5 MCG tablet Take 1 tablet by mouth daily 90 tablet 3    levothyroxine (SYNTHROID) 125 MCG tablet Take 1 tablet by mouth Daily 90 tablet 3    Vitamins-Lipotropics (MULTI-VITAMIN HP/MINERALS PO) Take 1 tablet by mouth daily      Turmeric 400 MG CAPS Take 1 capsule by mouth daily      Misc Natural Products (GLUCOS-CHONDROIT-MSM COMPLEX PO) Take 2 tablets by mouth daily       No current facility-administered medications for this visit. Review of Systems   Constitutional: Negative for activity change, chills and fever. HENT: Negative for congestion. Respiratory: Negative for cough and shortness of breath. Cardiovascular: Negative for chest pain and leg swelling. Genitourinary: Negative for dysuria, frequency and hematuria. Musculoskeletal: Negative for gait problem and myalgias. Neurological: Negative for dizziness and headaches. Psychiatric/Behavioral: Negative for agitation and sleep disturbance. The patient is not nervous/anxious.         Lab Results   Component Value Date    WBC 7.7 02/20/2020    HGB 12.2 (L) 02/20/2020    HCT 42.0 02/20/2020    MCV 82.0 Effort: Pulmonary effort is normal.      Breath sounds: Normal breath sounds. No wheezing or rales. Musculoskeletal: Normal range of motion. Right lower leg: No edema. Left lower leg: No edema. Neurological:      General: No focal deficit present. Mental Status: She is alert and oriented to person, place, and time. Psychiatric:         Mood and Affect: Mood normal.         Behavior: Behavior normal.         ASSESSMENT/ PLAN:    1. Acquired hypothyroidism  - Blood work under control  - liothyronine (CYTOMEL) 5 MCG tablet; Take 1 tablet by mouth daily  Dispense: 90 tablet; Refill: 3  - levothyroxine (SYNTHROID) 125 MCG tablet; Take 1 tablet by mouth Daily  Dispense: 90 tablet; Refill: 3    2. Gastroesophageal reflux disease without esophagitis  - stable  - omeprazole (PRILOSEC) 40 MG delayed release capsule; Take 1 capsule by mouth Daily  Dispense: 90 capsule; Refill: 3    3. Obesity, encourage loss wt    4. Hyperlipidemia, patient refuse medication, would like to try loss wt and exercise. - Appropriateprescription are addressed. - After visit summery provided. - Questions answered and patient verbalizes understanding.  - Call for any problem, questions, or concerns. Return in about 6 months (around 8/28/2020).

## 2020-03-04 ENCOUNTER — OFFICE VISIT (OUTPATIENT)
Dept: BARIATRICS/WEIGHT MGMT | Age: 45
End: 2020-03-04
Payer: COMMERCIAL

## 2020-03-04 VITALS
DIASTOLIC BLOOD PRESSURE: 88 MMHG | SYSTOLIC BLOOD PRESSURE: 124 MMHG | HEART RATE: 82 BPM | HEIGHT: 62 IN | WEIGHT: 243.6 LBS | BODY MASS INDEX: 44.83 KG/M2

## 2020-03-04 PROCEDURE — 99215 OFFICE O/P EST HI 40 MIN: CPT | Performed by: NURSE PRACTITIONER

## 2020-03-04 ASSESSMENT — ENCOUNTER SYMPTOMS
NAUSEA: 0
CHEST TIGHTNESS: 0
SHORTNESS OF BREATH: 0
RHINORRHEA: 0
ABDOMINAL PAIN: 0
DIARRHEA: 0
BACK PAIN: 1
ABDOMINAL DISTENTION: 0
EYE PAIN: 0
WHEEZING: 0
TROUBLE SWALLOWING: 0

## 2020-03-04 NOTE — PROGRESS NOTES
Yohana Manual  1975  38 y.o. SUBJECT IKN:    Chief Complaint   Patient presents with    Weight Management     Medication Group Class     Past Medical History:   Diagnosis Date    Endometriosis     Endometriosis of pelvis     Goiter 2001    Grave's disease     Pelvic inflammatory disease (PID)     Thyroid disease hypothyroid     Past Surgical History:   Procedure Laterality Date     SECTION      ENDOMETRIAL ABLATION      ENDOMETRIAL ABLATION      TUBAL LIGATION       Current Outpatient Medications   Medication Sig Dispense Refill    Phentermine-Topiramate 3.75-23 MG CP24 Take 1 tablet by mouth daily for 14 days. Take in am. 14 capsule 0    Phentermine-Topiramate 7.5-46 MG CP24 Take 1 tablet by mouth daily for 30 days. Take in am. 30 capsule 0    omeprazole (PRILOSEC) 40 MG delayed release capsule Take 1 capsule by mouth Daily 90 capsule 3    liothyronine (CYTOMEL) 5 MCG tablet Take 1 tablet by mouth daily 90 tablet 3    levothyroxine (SYNTHROID) 125 MCG tablet Take 1 tablet by mouth Daily 90 tablet 3    Vitamins-Lipotropics (MULTI-VITAMIN HP/MINERALS PO) Take 1 tablet by mouth daily      Turmeric 400 MG CAPS Take 1 capsule by mouth daily      Misc Natural Products (GLUCOS-CHONDROIT-MSM COMPLEX PO) Take 2 tablets by mouth daily       No current facility-administered medications for this visit. Family History   Problem Relation Age of Onset    High Blood Pressure Mother     Substance Abuse Mother     Depression Mother     Substance Abuse Father      No Known Allergies     HPI  HPI: Yohana Manual presents today for new medication group class by this provider. Patient had an initial individual provider consult and is here for their group education class. At initial consult weight loss medication qsymia was decided based on current BMI, prior attempts, and exclusion criteria based on past medical history.  Patient had work up completed and was reviewed with patient today. No new medications, recent illnesses or new medical history. Vit d 23 and elevated trig. Review of Systems   Constitutional: Negative. Negative for appetite change, fatigue and fever. HENT: Negative for congestion, dental problem, hearing loss, rhinorrhea and trouble swallowing. Eyes: Negative for pain. Respiratory: Negative for chest tightness, shortness of breath and wheezing. Cardiovascular: Negative for chest pain, palpitations and leg swelling. Gastrointestinal: Negative for abdominal distention, abdominal pain, diarrhea and nausea. Endocrine: Negative for cold intolerance and polydipsia. Genitourinary: Negative for difficulty urinating and frequency. Musculoskeletal: Positive for back pain. Negative for gait problem. Skin: Negative for rash. Allergic/Immunologic: Negative for environmental allergies. Neurological: Negative for dizziness, seizures and syncope. Hematological: Does not bruise/bleed easily. Psychiatric/Behavioral: Negative for behavioral problems and suicidal ideas. OBJECTIVE:     /88 (Site: Right Upper Arm, Position: Sitting, Cuff Size: Large Adult)   Pulse 82   Ht 5' 2\" (1.575 m)   Wt 243 lb 9.6 oz (110.5 kg)   BMI 44.56 kg/m²   Wt Readings from Last 3 Encounters:   03/04/20 243 lb 9.6 oz (110.5 kg)   02/28/20 244 lb 6.4 oz (110.9 kg)   02/19/20 246 lb (111.6 kg)       Physical Exam  Vitals signs and nursing note reviewed. Constitutional:       Appearance: She is well-developed. Comments: Obese   HENT:      Head: Normocephalic and atraumatic. Right Ear: Hearing and ear canal normal.      Left Ear: Hearing and ear canal normal.      Nose: Nose normal.      Mouth/Throat:      Pharynx: Uvula midline. Eyes:      Conjunctiva/sclera: Conjunctivae normal.      Pupils: Pupils are equal, round, and reactive to light. Neck:      Musculoskeletal: Normal range of motion.    Cardiovascular:      Rate and Rhythm: Normal rate and regular rhythm. Heart sounds: Normal heart sounds. Pulmonary:      Effort: Pulmonary effort is normal.      Breath sounds: Normal breath sounds. No decreased breath sounds, wheezing, rhonchi or rales. Abdominal:      General: Bowel sounds are normal.      Palpations: Abdomen is soft. Tenderness: There is no abdominal tenderness. Musculoskeletal: Normal range of motion. General: No tenderness. Comments: In all 4 extremities. Skin:     General: Skin is warm and dry. Findings: No rash. Neurological:      Mental Status: She is alert and oriented to person, place, and time. GCS: GCS eye subscore is 4. GCS verbal subscore is 5. GCS motor subscore is 6. Motor: No abnormal muscle tone. Psychiatric:         Behavior: Behavior normal.         Judgment: Judgment normal.       ASSESSMENT/ PLAN:    1. Morbid obesity due to excess calories ([address]  - Patient attended weight loss medication group class today. - Patient given prescription today to start Qsymia. Discussed taking qsymia over 4 hours from cytomel/synthroid. - Educated on importance of 2 forms of contraception, patient verbalizes. - Discussed possible side effects with patient in length via power point.    - Will monitor weight and vital signs weekly. Patient informed of importance and compliance with weekly weight and vital checks. - Will have vital check weekly and RTC in 1 month with NP.     - Obesity with a BMI of 44.  - Patient educated in depth on defining obesity and the risk factors associated when BMI >30 via power point.     - Recommend MVI Ca/Vit D daily.   - Pt instructed on healthy diet to support weight loss. Instructed on goal calorie intake, not less than 1,000 kcals daily. Educated on healthy diet framework to include adequate lean protein, non-starchy vegetables, and moderate carbohydrate and fruit intake. - Instructed on fluid intake at least 64 oz daily.  Patient instructed to refrain from any calorie enriched drinks. Recommend using meal replacements, 1-3 daily to support maintaining adequate protein and calorie goals. - Patient also educated on celebrate products sold in house and appropriate recommendations for vitamins and meal replacement shakes. - Patient was also informed on weight loss program and specific requirements to be met by all. Weight loss program contract signed prior. Patient aware of the medication compliance guidelines and will be removed from medication if the patient does not comply and medication handout given today. 2. Mixed hyperlipidemia  - Noted on recent labs. - PCP managing.   - Continue weight loss. 3. Vitamin D deficiency  - Already supplementing. Discussed making sure 1000 units daily. No orders of the defined types were placed in this encounter. Patient was seen with total face to face time of 40 minutes in a group setting. More than 50% of this visit was counseling on obesity, strict diet recommendations, exercise, current medication usage, possible side effects, nutrition and education as above in my assessment and plan section of my note. Return in about 3 weeks (around 3/25/2020) for Weight Check.     Lashon Nichols CNP

## 2020-03-17 LAB
EKG ATRIAL RATE: 75 BPM
EKG DIAGNOSIS: NORMAL
EKG P AXIS: 50 DEGREES
EKG P-R INTERVAL: 152 MS
EKG Q-T INTERVAL: 374 MS
EKG QRS DURATION: 82 MS
EKG QTC CALCULATION (BAZETT): 417 MS
EKG R AXIS: -1 DEGREES
EKG T AXIS: 29 DEGREES
EKG VENTRICULAR RATE: 75 BPM

## 2020-03-24 ENCOUNTER — TELEPHONE (OUTPATIENT)
Dept: BARIATRICS/WEIGHT MGMT | Age: 45
End: 2020-03-24

## 2020-04-02 ENCOUNTER — TELEPHONE (OUTPATIENT)
Dept: BARIATRICS/WEIGHT MGMT | Age: 45
End: 2020-04-02

## 2020-04-02 ENCOUNTER — TELEMEDICINE (OUTPATIENT)
Dept: BARIATRICS/WEIGHT MGMT | Age: 45
End: 2020-04-02

## 2020-04-02 ASSESSMENT — ENCOUNTER SYMPTOMS
ABDOMINAL PAIN: 0
TROUBLE SWALLOWING: 0
RHINORRHEA: 0
WHEEZING: 0
CHEST TIGHTNESS: 0
DIARRHEA: 0
SHORTNESS OF BREATH: 0
NAUSEA: 0
ABDOMINAL DISTENTION: 0
EYE PAIN: 0

## 2020-04-02 NOTE — TELEPHONE ENCOUNTER
Mason Camargo wants us to contact patient in 3-5 days to check on her    Occasional hot flashes.   If still experiencing should stop medication

## 2020-04-02 NOTE — PROGRESS NOTES
Discharge []  None visible  [] Abnormal -    HENT:   [] Normocephalic, atraumatic. [] Abnormal   [] Mouth/Throat: Mucous membranes are moist.     External Ears [] Normal  [] Abnormal-     Neck: [x] No visualized mass     Pulmonary/Chest: [x] Respiratory effort normal.  [] No visualized signs of difficulty breathing or respiratory distress        [] Abnormal-      Musculoskeletal:   [x] Normal gait with no signs of ataxia         [x] Normal range of motion of neck        [] Abnormal-       Neurological:        [x] No Facial Asymmetry (Cranial nerve 7 motor function) (limited exam to video visit)          [] No gaze palsy        [] Abnormal-         Skin:        [x] No significant exanthematous lesions or discoloration noted on facial skin         [] Abnormal-            Psychiatric:       [x] Normal Affect [] No Hallucinations        [] Abnormal-     Other pertinent observable physical exam findings-     ASSESSMENT/PLAN:  1. Morbid obesity due to excess calories ([address]  - Patient on qsymia, she is down -6 lbs. - States hot flashes but are improving.   - Will give medication 3-5 more days, if doesn't continue she needs to come off.   - 8 hour gap from thyroid medication to qsymia.   - Discussed ongoing calorie counting and water intake. - staff to follow up in 3-5 days.   - RTC 4 wks with NP, qsymia. 2. Mixed hyperlipidemia  - She needs to continue working on weight loss. - Will monitor through program.     Return in about 1 month (around 5/2/2020) for Weight Check. Erasmo Mckee is a 38 y.o. female being evaluated by a Virtual Visit (video visit) encounter to address concerns as mentioned above. A caregiver was present when appropriate. Due to this being a TeleHealth encounter (During [facility]-29 public health emergency), evaluation of the following organ systems was limited: Vitals/Constitutional/EENT/Resp/CV/GI//MS/Neuro/Skin/Heme-Lymph-Imm.   Pursuant to the emergency declaration under the [facility]

## 2020-04-30 ENCOUNTER — TELEMEDICINE (OUTPATIENT)
Dept: BARIATRICS/WEIGHT MGMT | Age: 45
End: 2020-04-30
Payer: COMMERCIAL

## 2020-04-30 PROCEDURE — 99213 OFFICE O/P EST LOW 20 MIN: CPT | Performed by: NURSE PRACTITIONER

## 2020-04-30 ASSESSMENT — ENCOUNTER SYMPTOMS
SHORTNESS OF BREATH: 0
NAUSEA: 0
ABDOMINAL PAIN: 0
DIARRHEA: 0
ABDOMINAL DISTENTION: 0
RHINORRHEA: 0
TROUBLE SWALLOWING: 0
EYE PAIN: 0
CHEST TIGHTNESS: 0
WHEEZING: 0

## 2020-04-30 NOTE — PROGRESS NOTES
2020    TELEHEALTH EVALUATION -- Audio/Visual (During [facility]-08 public health emergency)    HPI:    Juany Barrera (:  1975) has requested an audio/video evaluation for the following concern(s):    She presents virtually today for her follow up visit. She has been on qsymia daily. She denies any ill side effects. She has been following calorie intake. Water intake over 60 ounces. No new medications or health problems. Review of Systems   Constitutional: Negative. Negative for appetite change, fatigue and fever. HENT: Negative for congestion, dental problem, hearing loss, rhinorrhea and trouble swallowing. Eyes: Negative for pain. Respiratory: Negative for chest tightness, shortness of breath and wheezing. Cardiovascular: Negative for chest pain, palpitations and leg swelling. Gastrointestinal: Negative for abdominal distention, abdominal pain, diarrhea and nausea. Endocrine: Negative for cold intolerance and polydipsia. Genitourinary: Negative for difficulty urinating and frequency. Musculoskeletal: Negative for arthralgias and gait problem. Skin: Negative for rash. Allergic/Immunologic: Negative for environmental allergies. Neurological: Negative for dizziness, seizures and syncope. Hematological: Does not bruise/bleed easily. Psychiatric/Behavioral: Negative for behavioral problems and suicidal ideas. Prior to Visit Medications    Medication Sig Taking? Authorizing Provider   Phentermine-Topiramate 7.5-46 MG CP24 Take 1 tablet by mouth daily for 30 days.  Take in am. Yes KIRA Arellano - CNP   omeprazole (PRILOSEC) 40 MG delayed release capsule Take 1 capsule by mouth Daily  Jacob Tipton MD   liothyronine (CYTOMEL) 5 MCG tablet Take 1 tablet by mouth daily  Jacob Tipton MD   levothyroxine (SYNTHROID) 125 MCG tablet Take 1 tablet by mouth Daily  Jacob Tipton MD   Vitamins-Lipotropics (MULTI-VITAMIN HP/MINERALS PO) Take 1 tablet by mouth daily [x] No visualized mass     Pulmonary/Chest: [x] Respiratory effort normal.  [x] No visualized signs of difficulty breathing or respiratory distress        [] Abnormal-      Musculoskeletal:   [x] Normal gait with no signs of ataxia         [x] Normal range of motion of neck        [] Abnormal-       Neurological:        [x] No Facial Asymmetry (Cranial nerve 7 motor function) (limited exam to video visit)          [x] No gaze palsy        [] Abnormal-         Skin:        [x] No significant exanthematous lesions or discoloration noted on facial skin         [] Abnormal-            Psychiatric:       [x] Normal Affect [x] No Hallucinations        [] Abnormal-     Limited due to virtual.     ASSESSMENT/PLAN:  1. Morbid obesity due to excess calories ([address]  - No updated weight, will get scale and mychart results. - Continue working on medications management for obesity.   - Discussed ongoing diet and water intake. - Phentermine-Topiramate 7.5-46 MG CP24; Take 1 tablet by mouth daily for 30 days. Take in am.  Dispense: 30 capsule; Refill: 0  - RTC 1 month with NP.    2. Mixed hyperlipidemia  - secondary to obesity- Continue working on diet and weight loss. - Diet management, pcp for further management. Return in about 1 month (around 5/30/2020) for Weight Check. Marcell Gonzalez is a 38 y.o. female being evaluated by a Virtual Visit (video visit) encounter to address concerns as mentioned above. A caregiver was present when appropriate. Due to this being a TeleHealth encounter (During [facility]-12 public health emergency), evaluation of the following organ systems was limited: Vitals/Constitutional/EENT/Resp/CV/GI//MS/Neuro/Skin/Heme-Lymph-Imm.   Pursuant to the emergency declaration under the [facility], [facility] authority and the [employer] Act, this Virtual Visit was conducted with patient's (and/or legal guardian's)

## 2020-08-28 ENCOUNTER — OFFICE VISIT (OUTPATIENT)
Dept: FAMILY MEDICINE CLINIC | Age: 45
End: 2020-08-28
Payer: COMMERCIAL

## 2020-08-28 VITALS
SYSTOLIC BLOOD PRESSURE: 122 MMHG | DIASTOLIC BLOOD PRESSURE: 70 MMHG | BODY MASS INDEX: 44.87 KG/M2 | OXYGEN SATURATION: 97 % | HEIGHT: 62 IN | WEIGHT: 243.8 LBS | HEART RATE: 69 BPM | TEMPERATURE: 97.2 F

## 2020-08-28 PROCEDURE — 99213 OFFICE O/P EST LOW 20 MIN: CPT | Performed by: FAMILY MEDICINE

## 2020-08-28 RX ORDER — LEVOTHYROXINE SODIUM 0.12 MG/1
125 TABLET ORAL DAILY
Qty: 90 TABLET | Refills: 3 | Status: SHIPPED | OUTPATIENT
Start: 2020-08-28 | End: 2021-03-02 | Stop reason: SDUPTHER

## 2020-08-28 RX ORDER — OMEPRAZOLE 40 MG/1
40 CAPSULE, DELAYED RELEASE ORAL DAILY
Qty: 90 CAPSULE | Refills: 3 | Status: SHIPPED | OUTPATIENT
Start: 2020-08-28 | End: 2021-05-03

## 2020-08-28 RX ORDER — LIOTHYRONINE SODIUM 5 UG/1
5 TABLET ORAL DAILY
Qty: 90 TABLET | Refills: 3 | Status: SHIPPED | OUTPATIENT
Start: 2020-08-28 | End: 2021-03-04

## 2020-08-28 ASSESSMENT — ENCOUNTER SYMPTOMS
SHORTNESS OF BREATH: 0
COUGH: 0

## 2020-08-28 NOTE — PROGRESS NOTES
Jazmyne Haywood  19    Chief Complaint   Patient presents with    6 Month Follow-Up    Hypothyroidism    Hyperlipidemia    Gastroesophageal Reflux    Anemia           Patient here for f/u visit regarding GERD, HLD, and hypothyroidism, anemia, and  needs medication refill, doing fine and her Thyoid under control, and her GERD under control too. Patient not taking any medication for her cholesterol. Past Medical History:   Diagnosis Date    Endometriosis     Endometriosis of pelvis     Goiter     Grave's disease 2000    Pelvic inflammatory disease (PID)     Thyroid disease hypothyroid     Past Surgical History:   Procedure Laterality Date     SECTION      ENDOMETRIAL ABLATION      ENDOMETRIAL ABLATION      TUBAL LIGATION       Family History   Problem Relation Age of Onset    High Blood Pressure Mother     Substance Abuse Mother     Depression Mother     Substance Abuse Father      Social History     Socioeconomic History    Marital status:      Spouse name: Not on file    Number of children: Not on file    Years of education: Associates    Highest education level: Not on file   Occupational History    Occupation: RN   Social Needs    Financial resource strain: Not on file    Food insecurity     Worry: Not on file     Inability: Not on file   [employer] needs     Medical: Not on file     Non-medical: Not on file   Tobacco Use    Smoking status: Former Smoker     Packs/day: 0.25     Years: 18.00     Pack years: 4.50     Types: Cigarettes     Last attempt to quit: 2017     Years since quitting: 3.6    Smokeless tobacco: Never Used   Substance and Sexual Activity    Alcohol use:  Yes     Alcohol/week: 5.0 standard drinks     Types: 5 Glasses of wine per week     Comment: 3 x a week    Drug use: No    Sexual activity: Yes     Partners: Male   Lifestyle    Physical activity     Days per week: Not on file     Minutes per session: Not on file    Stress: Not on file   Relationships    Social connections     Talks on phone: Not on file     Gets together: Not on file     Attends [employer] service: Not on file     Active member of club or organization: Not on file     Attends meetings of clubs or organizations: Not on file     Relationship status: Not on file    Intimate partner violence     Fear of current or ex partner: Not on file     Emotionally abused: Not on file     Physically abused: Not on file     Forced sexual activity: Not on file   Other Topics Concern    Not on file   Social History Narrative    Not on file       No Known Allergies  Current Outpatient Medications   Medication Sig Dispense Refill    liothyronine (CYTOMEL) 5 MCG tablet Take 1 tablet by mouth daily 90 tablet 3    levothyroxine (SYNTHROID) 125 MCG tablet Take 1 tablet by mouth Daily 90 tablet 3    omeprazole (PRILOSEC) 40 MG delayed release capsule Take 1 capsule by mouth Daily 90 capsule 3    Vitamins-Lipotropics (MULTI-VITAMIN HP/MINERALS PO) Take 1 tablet by mouth daily      Turmeric 400 MG CAPS Take 1 capsule by mouth daily      Misc Natural Products (GLUCOS-CHONDROIT-MSM COMPLEX PO) Take 2 tablets by mouth daily       No current facility-administered medications for this visit. Review of Systems   Constitutional: Negative for activity change, chills and fever. HENT: Negative for congestion. Respiratory: Negative for cough and shortness of breath. Cardiovascular: Negative for chest pain and leg swelling. Genitourinary: Negative for dysuria, frequency and hematuria. Musculoskeletal: Negative for gait problem and myalgias. Neurological: Negative for dizziness and headaches. Psychiatric/Behavioral: Negative for agitation and sleep disturbance. The patient is not nervous/anxious.         Lab Results   Component Value Date    WBC 7.7 02/20/2020    HGB 12.2 (L) 02/20/2020    HCT 42.0 02/20/2020    MCV 82.0 02/20/2020     02/20/2020 Lab Results   Component Value Date     (L) 02/20/2020    K 4.1 02/20/2020    CL 94 (L) 02/20/2020    CO2 26 02/20/2020    BUN 15 02/20/2020    CREATININE 1.1 02/20/2020    GLUCOSE 88 02/20/2020    CALCIUM 9.7 02/20/2020    PROT 7.9 02/20/2020    LABALBU 4.4 02/20/2020    BILITOT 0.2 02/20/2020    ALKPHOS 85 02/20/2020    AST 23 02/20/2020    ALT 17 02/20/2020    LABGLOM 54 (L) 02/20/2020    GFRAA >60 02/20/2020    AGRATIO 1.4 04/18/2018    GLOB 3.2 04/18/2018     Lab Results   Component Value Date    CHOL 232 (H) 02/20/2020    CHOL 181 08/09/2018     Lab Results   Component Value Date    TRIG 293 (H) 02/20/2020    TRIG 178 (H) 08/09/2018     Lab Results   Component Value Date    HDL 56 02/20/2020    HDL 49 08/09/2018     Lab Results   Component Value Date    LDLCALC 96 08/09/2018     No results found for: LABA1C  Lab Results   Component Value Date    TSH 0.12 (L) 08/09/2018    TSHHS 2.030 02/20/2020         /70 (Site: Right Upper Arm, Position: Sitting, Cuff Size: Large Adult)   Pulse 69   Temp 97.2 °F (36.2 °C)   Ht 5' 2\" (1.575 m)   Wt 243 lb 12.8 oz (110.6 kg)   SpO2 97%   BMI 44.59 kg/m²     BP Readings from Last 3 Encounters:   08/28/20 122/70   03/04/20 124/88   02/28/20 124/70       Wt Readings from Last 3 Encounters:   08/28/20 243 lb 12.8 oz (110.6 kg)   03/04/20 243 lb 9.6 oz (110.5 kg)   02/28/20 244 lb 6.4 oz (110.9 kg)         Physical Exam  Constitutional:       General: She is not in acute distress. Appearance: Normal appearance. She is well-developed. She is obese. She is not ill-appearing or diaphoretic. HENT:      Head: Normocephalic and atraumatic. Eyes:      General: No scleral icterus. Extraocular Movements: Extraocular movements intact. Pupils: Pupils are equal, round, and reactive to light. Neck:      Musculoskeletal: Normal range of motion and neck supple. No neck rigidity or muscular tenderness.    Cardiovascular:      Rate and Rhythm: Normal rate and regular rhythm. Heart sounds: No murmur. Pulmonary:      Effort: Pulmonary effort is normal.      Breath sounds: Normal breath sounds. No wheezing or rales. Musculoskeletal: Normal range of motion. Right lower leg: No edema. Left lower leg: No edema. Neurological:      General: No focal deficit present. Mental Status: She is alert and oriented to person, place, and time. Psychiatric:         Mood and Affect: Mood normal.         Behavior: Behavior normal.         ASSESSMENT/ PLAN:    1. Acquired hypothyroidism  - stable  - liothyronine (CYTOMEL) 5 MCG tablet; Take 1 tablet by mouth daily  Dispense: 90 tablet; Refill: 3  - levothyroxine (SYNTHROID) 125 MCG tablet; Take 1 tablet by mouth Daily  Dispense: 90 tablet; Refill: 3  - T4, Free; Future  - TSH without Reflex; Future    2. Gastroesophageal reflux disease without esophagitis  - stable  - omeprazole (PRILOSEC) 40 MG delayed release capsule; Take 1 capsule by mouth Daily  Dispense: 90 capsule; Refill: 3    3. Mixed hyperlipidemia  - just diet control    4. Anemia, unspecified type  - on iron  - CBC Auto Differential; Future  - Lipid Panel; Future  - IRON AND TIBC; Future  - FERRITIN; Future    5. Obesity, Class III, BMI 40-49.9 (morbid obesity) ([facility]  - encourage wt loss              - All old blood work reviewed with the patient  - Appropriate prescription are addressed. - After visit summery provided. - Questions answered and patient verbalizes understanding.  - Call for any problem, questions, or concerns. Return in about 6 months (around 2/28/2021).

## 2020-10-13 ENCOUNTER — HOSPITAL ENCOUNTER (OUTPATIENT)
Age: 45
Setting detail: SPECIMEN
Discharge: HOME | End: 2020-10-13
Payer: COMMERCIAL

## 2020-10-13 ENCOUNTER — OFFICE VISIT (OUTPATIENT)
Dept: PRIMARY CARE CLINIC | Age: 45
End: 2020-10-13
Payer: COMMERCIAL

## 2020-10-13 VITALS — TEMPERATURE: 97.2 F | HEART RATE: 103 BPM | OXYGEN SATURATION: 97 %

## 2020-10-13 PROCEDURE — U0002 COVID-19 LAB TEST NON-CDC: HCPCS

## 2020-10-13 PROCEDURE — 99211 OFF/OP EST MAY X REQ PHY/QHP: CPT | Performed by: INTERNAL MEDICINE

## 2020-10-15 LAB
SARS-COV-2: NOT DETECTED
SOURCE: NORMAL

## 2020-11-30 ENCOUNTER — TELEPHONE (OUTPATIENT)
Dept: FAMILY MEDICINE CLINIC | Age: 45
End: 2020-11-30

## 2020-11-30 RX ORDER — NITROFURANTOIN 25; 75 MG/1; MG/1
100 CAPSULE ORAL 2 TIMES DAILY
Qty: 20 CAPSULE | Refills: 0 | Status: SHIPPED | OUTPATIENT
Start: 2020-11-30 | End: 2020-12-10

## 2020-11-30 NOTE — TELEPHONE ENCOUNTER
Patient is in [place] and has a UTI. Patient states having burning with urination and strong odder. Patient would like to know if you can send a script to the [facility].       [address]  Phone: [phone]

## 2021-02-04 ENCOUNTER — HOSPITAL ENCOUNTER (OUTPATIENT)
Age: 46
Discharge: HOME | End: 2021-02-04
Payer: COMMERCIAL

## 2021-02-04 LAB
BASOPHILS ABSOLUTE: 0 K/CU MM
BASOPHILS RELATIVE PERCENT: 0.5 % (ref 0–1)
CHOLESTEROL: 261 MG/DL
DIFFERENTIAL TYPE: ABNORMAL
EOSINOPHILS ABSOLUTE: 0.2 K/CU MM
EOSINOPHILS RELATIVE PERCENT: 2.8 % (ref 0–3)
FERRITIN: 398 NG/ML (ref 15–150)
HCT VFR BLD CALC: 39.3 % (ref 37–47)
HDLC SERPL-MCNC: 51 MG/DL
HEMOGLOBIN: 11.5 GM/DL (ref 12.5–16)
IMMATURE NEUTROPHIL %: 0.2 % (ref 0–0.43)
IRON: 112 UG/DL (ref 37–145)
LDL CHOLESTEROL DIRECT: 172 MG/DL
LYMPHOCYTES ABSOLUTE: 2.1 K/CU MM
LYMPHOCYTES RELATIVE PERCENT: 33.7 % (ref 24–44)
MCH RBC QN AUTO: 23.3 PG (ref 27–31)
MCHC RBC AUTO-ENTMCNC: 29.3 % (ref 32–36)
MCV RBC AUTO: 79.7 FL (ref 78–100)
MONOCYTES ABSOLUTE: 0.5 K/CU MM
MONOCYTES RELATIVE PERCENT: 8.1 % (ref 0–4)
NUCLEATED RBC %: 0 %
PCT TRANSFERRIN: 33 % (ref 10–44)
PDW BLD-RTO: 14.2 % (ref 11.7–14.9)
PLATELET # BLD: 237 K/CU MM (ref 140–440)
PMV BLD AUTO: 10.5 FL (ref 7.5–11.1)
RBC # BLD: 4.93 M/CU MM (ref 4.2–5.4)
SEGMENTED NEUTROPHILS ABSOLUTE COUNT: 3.4 K/CU MM
SEGMENTED NEUTROPHILS RELATIVE PERCENT: 54.7 % (ref 36–66)
T4 FREE: 0.76 NG/DL (ref 0.9–1.8)
TOTAL IMMATURE NEUTOROPHIL: 0.01 K/CU MM
TOTAL IRON BINDING CAPACITY: 341 UG/DL (ref 250–450)
TOTAL NUCLEATED RBC: 0 K/CU MM
TRIGL SERPL-MCNC: 332 MG/DL
TSH HIGH SENSITIVITY: 6.28 UIU/ML (ref 0.27–4.2)
UNSATURATED IRON BINDING CAPACITY: 229 UG/DL (ref 110–370)
WBC # BLD: 6.2 K/CU MM (ref 4–10.5)

## 2021-02-04 PROCEDURE — 36415 COLL VENOUS BLD VENIPUNCTURE: CPT

## 2021-02-04 PROCEDURE — 82728 ASSAY OF FERRITIN: CPT

## 2021-02-04 PROCEDURE — 85025 COMPLETE CBC W/AUTO DIFF WBC: CPT

## 2021-02-04 PROCEDURE — 83721 ASSAY OF BLOOD LIPOPROTEIN: CPT

## 2021-02-04 PROCEDURE — 80061 LIPID PANEL: CPT

## 2021-02-04 PROCEDURE — 84443 ASSAY THYROID STIM HORMONE: CPT

## 2021-02-04 PROCEDURE — 84439 ASSAY OF FREE THYROXINE: CPT

## 2021-02-04 PROCEDURE — 83550 IRON BINDING TEST: CPT

## 2021-02-04 PROCEDURE — 83540 ASSAY OF IRON: CPT

## 2021-03-02 ENCOUNTER — OFFICE VISIT (OUTPATIENT)
Dept: FAMILY MEDICINE CLINIC | Age: 46
End: 2021-03-02
Payer: COMMERCIAL

## 2021-03-02 VITALS
BODY MASS INDEX: 44.99 KG/M2 | DIASTOLIC BLOOD PRESSURE: 78 MMHG | WEIGHT: 246 LBS | HEART RATE: 72 BPM | OXYGEN SATURATION: 100 % | SYSTOLIC BLOOD PRESSURE: 116 MMHG

## 2021-03-02 DIAGNOSIS — E78.2 MIXED HYPERLIPIDEMIA: ICD-10-CM

## 2021-03-02 DIAGNOSIS — E03.9 ACQUIRED HYPOTHYROIDISM: Primary | ICD-10-CM

## 2021-03-02 DIAGNOSIS — R06.83 SNORING: ICD-10-CM

## 2021-03-02 DIAGNOSIS — M25.562 CHRONIC PAIN OF BOTH KNEES: ICD-10-CM

## 2021-03-02 DIAGNOSIS — D64.9 ANEMIA, UNSPECIFIED TYPE: ICD-10-CM

## 2021-03-02 DIAGNOSIS — M25.561 CHRONIC PAIN OF BOTH KNEES: ICD-10-CM

## 2021-03-02 DIAGNOSIS — E66.01 OBESITY, CLASS III, BMI 40-49.9 (MORBID OBESITY) (HCC): ICD-10-CM

## 2021-03-02 DIAGNOSIS — G89.29 CHRONIC PAIN OF BOTH KNEES: ICD-10-CM

## 2021-03-02 DIAGNOSIS — K21.9 GASTROESOPHAGEAL REFLUX DISEASE WITHOUT ESOPHAGITIS: ICD-10-CM

## 2021-03-02 PROCEDURE — 99214 OFFICE O/P EST MOD 30 MIN: CPT | Performed by: FAMILY MEDICINE

## 2021-03-02 RX ORDER — PRAVASTATIN SODIUM 20 MG
20 TABLET ORAL DAILY
Qty: 30 TABLET | Refills: 5 | Status: SHIPPED | OUTPATIENT
Start: 2021-03-02 | End: 2021-06-05 | Stop reason: SDUPTHER

## 2021-03-02 RX ORDER — LEVOTHYROXINE SODIUM 137 UG/1
137 TABLET ORAL DAILY
Qty: 90 TABLET | Refills: 3 | Status: SHIPPED | OUTPATIENT
Start: 2021-03-02 | End: 2021-06-05 | Stop reason: SDUPTHER

## 2021-03-02 RX ORDER — BUPROPION HYDROCHLORIDE 150 MG/1
150 TABLET ORAL EVERY MORNING
Qty: 30 TABLET | Refills: 3 | Status: SHIPPED | OUTPATIENT
Start: 2021-03-02 | End: 2021-04-19

## 2021-03-02 ASSESSMENT — PATIENT HEALTH QUESTIONNAIRE - PHQ9
1. LITTLE INTEREST OR PLEASURE IN DOING THINGS: 0
SUM OF ALL RESPONSES TO PHQ9 QUESTIONS 1 & 2: 1
2. FEELING DOWN, DEPRESSED OR HOPELESS: 1
SUM OF ALL RESPONSES TO PHQ QUESTIONS 1-9: 1

## 2021-03-02 NOTE — PROGRESS NOTES
Radha Philip  20    Chief Complaint   Patient presents with    6 Month Follow-Up    Knee Pain     Patient states having bilateral knee pain. Patient would like referrall to ortho    Hypothyroidism    Hyperlipidemia    Gastroesophageal Reflux    Medication Refill           Patient here for 6 months f/u regarding GERD, HLD, hypothyroidism, and anemia, and  needs medication refill, doing fine and her thyroid under control, and her GERD under control too. Patient not taking any medication for her cholesterol. Patient also c/o:    Knee Pain   There was no injury mechanism. The pain is present in the left knee and right knee. The pain is at a severity of 7/10. The pain is moderate. The pain has been constant since onset. The symptoms are aggravated by movement. She has tried nothing for the symptoms.        Past Medical History:   Diagnosis Date    Endometriosis     Endometriosis of pelvis     Goiter     Grave's disease     Pelvic inflammatory disease (PID)     Thyroid disease hypothyroid     Past Surgical History:   Procedure Laterality Date     SECTION      ENDOMETRIAL ABLATION      ENDOMETRIAL ABLATION      TUBAL LIGATION       Family History   Problem Relation Age of Onset    High Blood Pressure Mother     Substance Abuse Mother     Depression Mother     Substance Abuse Father      Social History     Socioeconomic History    Marital status:      Spouse name: Not on file    Number of children: Not on file    Years of education: Associates    Highest education level: Not on file   Occupational History    Occupation: RN   Social Needs    Financial resource strain: Not on file    Food insecurity     Worry: Not on file     Inability: Not on file   [employer] needs     Medical: Not on file     Non-medical: Not on file   Tobacco Use    Smoking status: Former Smoker     Packs/day: 0.25     Years: 18.00     Pack years: 4.50     Types: Cigarettes     Quit date:      Years since quittin.1    Smokeless tobacco: Never Used   Substance and Sexual Activity    Alcohol use: Yes     Alcohol/week: 5.0 standard drinks     Types: 5 Glasses of wine per week     Comment: 3 x a week    Drug use: No    Sexual activity: Yes     Partners: Male   Lifestyle    Physical activity     Days per week: Not on file     Minutes per session: Not on file    Stress: Not on file   Relationships    Social connections     Talks on phone: Not on file     Gets together: Not on file     Attends [employer] service: Not on file     Active member of club or organization: Not on file     Attends meetings of clubs or organizations: Not on file     Relationship status: Not on file    Intimate partner violence     Fear of current or ex partner: Not on file     Emotionally abused: Not on file     Physically abused: Not on file     Forced sexual activity: Not on file   Other Topics Concern    Not on file   Social History Narrative    Not on file       No Known Allergies  Current Outpatient Medications   Medication Sig Dispense Refill    levothyroxine (SYNTHROID) 137 MCG tablet Take 1 tablet by mouth Daily Needs the synthroid 90 tablet 3    pravastatin (PRAVACHOL) 20 MG tablet Take 1 tablet by mouth daily 30 tablet 5    buPROPion (WELLBUTRIN XL) 150 MG extended release tablet Take 1 tablet by mouth every morning 30 tablet 3    Vitamins-Lipotropics (MULTI-VITAMIN HP/MINERALS PO) Take 1 tablet by mouth daily      Misc Natural Products (GLUCOS-CHONDROIT-MSM COMPLEX PO) Take 2 tablets by mouth daily      liothyronine (CYTOMEL) 5 MCG tablet TAKE 1 TABLET BY MOUTH DAILY 30 tablet 5    omeprazole (PRILOSEC) 40 MG delayed release capsule Take 1 capsule by mouth Daily 90 capsule 3    Turmeric 400 MG CAPS Take 1 capsule by mouth daily       No current facility-administered medications for this visit.         Review of Systems   Constitutional: Negative for activity change, chills and fever. HENT: Negative for congestion. Respiratory: Negative for cough and shortness of breath. Cardiovascular: Negative for chest pain and leg swelling. Genitourinary: Negative for dysuria, frequency and hematuria. Musculoskeletal: Positive for arthralgias (both knees). Negative for gait problem. Neurological: Negative for dizziness and headaches. Psychiatric/Behavioral: Negative for agitation and sleep disturbance. The patient is not nervous/anxious.         Lab Results   Component Value Date    WBC 6.2 02/04/2021    HGB 11.5 (L) 02/04/2021    HCT 39.3 02/04/2021    MCV 79.7 02/04/2021     02/04/2021     Lab Results   Component Value Date     (L) 02/20/2020    K 4.1 02/20/2020    CL 94 (L) 02/20/2020    CO2 26 02/20/2020    BUN 15 02/20/2020    CREATININE 1.1 02/20/2020    GLUCOSE 88 02/20/2020    CALCIUM 9.7 02/20/2020    PROT 7.9 02/20/2020    LABALBU 4.4 02/20/2020    BILITOT 0.2 02/20/2020    ALKPHOS 85 02/20/2020    AST 23 02/20/2020    ALT 17 02/20/2020    LABGLOM 54 (L) 02/20/2020    GFRAA >60 02/20/2020    AGRATIO 1.4 04/18/2018    GLOB 3.2 04/18/2018     Lab Results   Component Value Date    CHOL 261 (H) 02/04/2021    CHOL 232 (H) 02/20/2020    CHOL 181 08/09/2018     Lab Results   Component Value Date    TRIG 332 (H) 02/04/2021    TRIG 293 (H) 02/20/2020    TRIG 178 (H) 08/09/2018     Lab Results   Component Value Date    HDL 51 02/04/2021    HDL 56 02/20/2020    HDL 49 08/09/2018     Lab Results   Component Value Date    LDLCALC 96 08/09/2018     No results found for: LABA1C  Lab Results   Component Value Date    TSH 0.12 (L) 08/09/2018    TSHHS 6.280 (H) 02/04/2021         /78 (Site: Left Upper Arm, Position: Sitting, Cuff Size: Large Adult)   Pulse 72   Wt 246 lb (111.6 kg)   SpO2 100%   BMI 44.99 kg/m²     BP Readings from Last 3 Encounters:   03/02/21 116/78   08/28/20 122/70   03/04/20 124/88       Wt Readings from Last 3 Encounters:   03/02/21 246 lb (111.6 kg)   08/28/20 243 lb 12.8 oz (110.6 kg)   03/04/20 243 lb 9.6 oz (110.5 kg)         Physical Exam  Constitutional:       General: She is not in acute distress. Appearance: Normal appearance. She is well-developed. She is obese. She is not ill-appearing or diaphoretic. HENT:      Head: Normocephalic and atraumatic. Eyes:      General: No scleral icterus. Extraocular Movements: Extraocular movements intact. Pupils: Pupils are equal, round, and reactive to light. Neck:      Musculoskeletal: Normal range of motion and neck supple. No neck rigidity or muscular tenderness. Cardiovascular:      Rate and Rhythm: Normal rate and regular rhythm. Heart sounds: No murmur. Pulmonary:      Effort: Pulmonary effort is normal.      Breath sounds: Normal breath sounds. No wheezing or rales. Musculoskeletal: Normal range of motion. Right knee: She exhibits normal range of motion, no swelling, no deformity and no erythema. No tenderness found. Left knee: She exhibits normal range of motion, no swelling, no deformity and no erythema. No tenderness found. Right lower leg: No edema. Left lower leg: No edema. Neurological:      General: No focal deficit present. Mental Status: She is alert and oriented to person, place, and time. Psychiatric:         Mood and Affect: Mood normal.         Behavior: Behavior normal.         ASSESSMENT/ PLAN:    1. Acquired hypothyroidism  - from the labs, we increase her thyroid medication  - levothyroxine (SYNTHROID) 137 MCG tablet; Take 1 tablet by mouth Daily Needs the synthroid  Dispense: 90 tablet; Refill: 3    2. Mixed hyperlipidemia  - stable  - pravastatin (PRAVACHOL) 20 MG tablet; Take 1 tablet by mouth daily  Dispense: 30 tablet; Refill: 5    3. Gastroesophageal reflux disease without esophagitis  - stable    4. Anemia, unspecified type  - stable    5.  Obesity, Class III, BMI 40-49.9 (morbid obesity) ([facility]  - encourage smoking cessation    6. Chronic pain of both knees  - XR KNEE LEFT (3 VIEWS); Future  - XR KNEE RIGHT (3 VIEWS); Future  - [place] Yun [facility]    7. Snoring  - Ambulatory referral to Sleep Medicine              - All old blood work reviewed with the patient  - Appropriate prescription are addressed. - After visit summery provided. - Questions answered and patient verbalizes understanding.  - Call for any problem, questions, or concerns. Return in about 3 months (around 6/2/2021).

## 2021-03-03 ENCOUNTER — HOSPITAL ENCOUNTER (OUTPATIENT)
Age: 46
Discharge: HOME | End: 2021-03-03
Payer: COMMERCIAL

## 2021-03-03 ENCOUNTER — HOSPITAL ENCOUNTER (OUTPATIENT)
Dept: GENERAL RADIOLOGY | Age: 46
Discharge: HOME | End: 2021-03-03
Payer: COMMERCIAL

## 2021-03-03 DIAGNOSIS — M25.561 CHRONIC PAIN OF BOTH KNEES: ICD-10-CM

## 2021-03-03 DIAGNOSIS — G89.29 CHRONIC PAIN OF BOTH KNEES: ICD-10-CM

## 2021-03-03 DIAGNOSIS — M25.562 CHRONIC PAIN OF BOTH KNEES: ICD-10-CM

## 2021-03-03 PROCEDURE — 73562 X-RAY EXAM OF KNEE 3: CPT

## 2021-03-04 ENCOUNTER — TELEPHONE (OUTPATIENT)
Dept: FAMILY MEDICINE CLINIC | Age: 46
End: 2021-03-04

## 2021-03-04 NOTE — TELEPHONE ENCOUNTER
Patient called office wondering why we had called her. Patient while I was pulling up chart put me on hold. I could no longer hold and hung up after wait.

## 2021-03-07 PROBLEM — M25.562 CHRONIC PAIN OF BOTH KNEES: Status: ACTIVE | Noted: 2021-03-07

## 2021-03-07 PROBLEM — R06.83 SNORING: Status: ACTIVE | Noted: 2021-03-07

## 2021-03-07 PROBLEM — M25.561 CHRONIC PAIN OF BOTH KNEES: Status: ACTIVE | Noted: 2021-03-07

## 2021-03-07 PROBLEM — D64.9 ANEMIA: Status: ACTIVE | Noted: 2021-03-07

## 2021-03-07 PROBLEM — G89.29 CHRONIC PAIN OF BOTH KNEES: Status: ACTIVE | Noted: 2021-03-07

## 2021-03-07 ASSESSMENT — ENCOUNTER SYMPTOMS
SHORTNESS OF BREATH: 0
COUGH: 0

## 2021-03-08 ENCOUNTER — TELEPHONE (OUTPATIENT)
Dept: ORTHOPEDIC SURGERY | Age: 46
End: 2021-03-08

## 2021-03-08 ENCOUNTER — OFFICE VISIT (OUTPATIENT)
Dept: ORTHOPEDIC SURGERY | Age: 46
End: 2021-03-08
Payer: COMMERCIAL

## 2021-03-08 VITALS
RESPIRATION RATE: 16 BRPM | HEART RATE: 78 BPM | WEIGHT: 246 LBS | BODY MASS INDEX: 45.27 KG/M2 | OXYGEN SATURATION: 99 % | HEIGHT: 62 IN

## 2021-03-08 DIAGNOSIS — M17.11 ARTHRITIS OF KNEE, RIGHT: Primary | ICD-10-CM

## 2021-03-08 DIAGNOSIS — M17.12 ARTHRITIS OF KNEE, LEFT: ICD-10-CM

## 2021-03-08 PROCEDURE — 99203 OFFICE O/P NEW LOW 30 MIN: CPT | Performed by: PHYSICIAN ASSISTANT

## 2021-03-08 PROCEDURE — 20610 DRAIN/INJ JOINT/BURSA W/O US: CPT | Performed by: PHYSICIAN ASSISTANT

## 2021-03-08 NOTE — PATIENT INSTRUCTIONS
Bilateral knee steroid injection given today  Activity as tolerated  We will try to get viscosupplementation injection approved then see the patient back for both knees  Follow-up once viscosupplementation injections approved for 6 weeks whichever comes first

## 2021-03-08 NOTE — LETTER
[facility]  [address] [address]  Phone: [phone]  Fax: [phone]    Christo Mujica        March 8, 2021     Patient: Qi Montero   YOB: 1975   Date of Visit: 3/8/2021       To Whom It May Concern: It is my medical opinion that Rob Penn should be off work tonight 3/8/2021 but may return to work on 3/9/2021 due to bilateral steroid injections in knees. If you have any questions or concerns, please don't hesitate to call.     Sincerely,        Sherice Willis PA-C

## 2021-03-08 NOTE — PROGRESS NOTES
Review of Systems   Constitutional: Negative. HENT: Negative. Eyes: Negative. Respiratory: Negative. Cardiovascular: Negative. Gastrointestinal: Negative. Genitourinary: Negative. Musculoskeletal: Positive for arthralgias. Skin: Negative. Neurological: Negative. Psychiatric/Behavioral: Negative. HPI:  Iglesia Pierce is a 54 y.o. female that presents the office today complaining of bilateral knee pain that she rates at a 5/10, aching in nature with no particular injury. Patient states that she has had knee pain since she was been younger but as she has gotten a little bit older her knees of felt worse. Pain is worse going up and down stairs and after sitting for a long period of time then going to rise. She has taken over-the-counter medications with mild relief of pain but seeks definitive treatment today.       Past Medical History:   Diagnosis Date    Endometriosis     Endometriosis of pelvis     Goiter 2001    Grave's disease 2000    Pelvic inflammatory disease (PID) 1992    Thyroid disease hypothyroid       Past Surgical History:   Procedure Laterality Date     SECTION      ENDOMETRIAL ABLATION      ENDOMETRIAL ABLATION  2011    TUBAL LIGATION         Family History   Problem Relation Age of Onset    High Blood Pressure Mother     Substance Abuse Mother     Depression Mother     Substance Abuse Father        Social History     Socioeconomic History    Marital status:      Spouse name: None    Number of children: None    Years of education: Associates    Highest education level: None   Occupational History    Occupation: RN   Social Needs    Financial resource strain: None    Food insecurity     Worry: None     Inability: None    Transportation needs     Medical: None     Non-medical: None   Tobacco Use    Smoking status: Former Smoker     Packs/day: 0.25     Years: 18.00     Pack years: 4.50     Types: Cigarettes     Quit date: 2017     Years since quittin.2    Smokeless tobacco: Never Used   Substance and Sexual Activity    Alcohol use: Yes     Alcohol/week: 5.0 standard drinks     Types: 5 Glasses of wine per week     Comment: 3 x a week    Drug use: No    Sexual activity: Yes     Partners: Male   Lifestyle    Physical activity     Days per week: None     Minutes per session: None    Stress: None   Relationships    Social connections     Talks on phone: None     Gets together: None     Attends [employer] service: None     Active member of club or organization: None     Attends meetings of clubs or organizations: None     Relationship status: None    Intimate partner violence     Fear of current or ex partner: None     Emotionally abused: None     Physically abused: None     Forced sexual activity: None   Other Topics Concern    None   Social History Narrative    None       Current Outpatient Medications   Medication Sig Dispense Refill    liothyronine (CYTOMEL) 5 MCG tablet TAKE 1 TABLET BY MOUTH DAILY 30 tablet 5    levothyroxine (SYNTHROID) 137 MCG tablet Take 1 tablet by mouth Daily Needs the synthroid 90 tablet 3    pravastatin (PRAVACHOL) 20 MG tablet Take 1 tablet by mouth daily 30 tablet 5    buPROPion (WELLBUTRIN XL) 150 MG extended release tablet Take 1 tablet by mouth every morning 30 tablet 3    Vitamins-Lipotropics (MULTI-VITAMIN HP/MINERALS PO) Take 1 tablet by mouth daily      omeprazole (PRILOSEC) 40 MG delayed release capsule Take 1 capsule by mouth Daily 90 capsule 3     No current facility-administered medications for this visit. No Known Allergies    Review of Systems:  See above      Physical Exam:   Pulse 78   Resp 16   Ht 5' 2\" (1.575 m)   Wt 246 lb (111.6 kg)   SpO2 99%   BMI 44.99 kg/m²        Gait is Antalgic. The patient can bear weight on the injured extremity. Gen/Psych:Examination reveals a pleasant individual in no acute distress.  The patient is oriented to time, place and person. The patient's mood and affect are appropriate. Patient appears well nourished. Body habitus is obese     Lymph:  No lymphedema in bilateral lower extremities     Skin intact in bilateral lower extremities with no ulcerations, lesions, rash, erythema. Vascular: There are no varicosities in bilateral lower extremities, sensation intact to light touch over bilateral lower extremities. bilateral leg/knee exam:  Leg alignment:     neutral  Quadriceps/hamstring atrophy:   no  Knee effusion:    no   Knee erythema:   no  ROM:     Full, 0-120 degrees  Varus laxity at 0 and 30 deg's: no  Valguslaxity at 0 and 30 deg's: no  Recurvatum:    no  Tenderness at:   Medial joint line    Bilateral Knee strength is 5/5 flexion and extension  There is + L2-S1 motor and sensory function in bilateral lower extremities    Imaging studies:  Bilateral knee x-rays were reviewed  The left and right knee show moderate joint space narrowing within the medial compartment. The left knee medial compartment shows about 50% joint space loss and also moderate to severe joint space loss in the patellofemoral compartment. The right knee joint space loss is approximately 50% as well with small periarticular osteophyte seen. Impression:    Diagnosis Orders   1. Arthritis of knee, right  MN ARTHROCENTESIS ASPIR&/INJ MAJOR JT/BURSA W/O US   2. Arthritis of knee, left  MN ARTHROCENTESIS ASPIR&/INJ MAJOR JT/BURSA W/O US           Plan:  Natural history and expected course discussed. Questions answered. Patient Instructions   Bilateral knee steroid injection given today  Activity as tolerated  We will try to get viscosupplementation injection approved then see the patient back for both knees  Follow-up once viscosupplementation injections approved for 6 weeks whichever comes first      Left knee injection procedure note    Pre-procedure diagnosis:  Left knee DJD    Post-procedure diagnosis:  same    Procedure:  The planned procedure/risks/benefits/alternatives were discussed with the patient. Risks include, but are not limited to, increased pain, drug reaction, infection, bleeding, lack of improvement, neurovascular injury, and increased blood sugar levels. The patient understood and all of their questions were answered. The Left knee was prepped with alcohol then a 22 gauge needle was advanced into the inferior-lateral joint without difficulty. The joint was then injected with 1 ml 1% lidocaine, 1ml of Kenalog (40 mg/ml), 1ml 0.5% bupivacaine the injection site was cleansed with isopropyl alcohol and a band-aid was placed. Complications:  None, the patient tolerated the procedure well. Right knee injection procedure note    Pre-procedure diagnosis:  Right knee DJD    Post-procedure diagnosis:  same    Procedure: The planned procedure/risks/benefits/alternatives were discussed with the patient. Risks include, but are not limited to, increased pain, drug reaction, infection, bleeding, lack of improvement, neurovascular injury, and increased blood sugar levels. The patient understood and all of their questions were answered. The right knee was prepped with alcohol, then a 22 gauge needle was advanced into the inferior-lateral joint without difficulty. The joint was then injected with 1 ml 1% lidocaine, 1ml of Kenalog (40 mg/ml), 1ml of 0.5% bupivacaine. The injection site was cleansed with isopropyl alcohol and a band-aid was placed. Complications:  None, the patient tolerated the procedure well. Instructions: The patient was advised to rest the knee and decrease activity for the next 24 to 48 hours. May use prescription or OTC pain relievers as needed for any post-injection pain as well as ice.

## 2021-03-12 ENCOUNTER — HOSPITAL ENCOUNTER (OUTPATIENT)
Dept: SLEEP CENTER | Age: 46
Discharge: HOME | End: 2021-03-12
Payer: COMMERCIAL

## 2021-03-12 VITALS — WEIGHT: 246 LBS | BODY MASS INDEX: 45.27 KG/M2 | HEIGHT: 62 IN

## 2021-03-12 DIAGNOSIS — R06.83 SNORING: ICD-10-CM

## 2021-03-12 DIAGNOSIS — G47.10 HYPERSOMNOLENCE: Primary | ICD-10-CM

## 2021-03-12 PROCEDURE — 99211 OFF/OP EST MAY X REQ PHY/QHP: CPT

## 2021-03-12 ASSESSMENT — SLEEP AND FATIGUE QUESTIONNAIRES
HOW LIKELY ARE YOU TO NOD OFF OR FALL ASLEEP WHILE SITTING AND READING: 3
HOW LIKELY ARE YOU TO NOD OFF OR FALL ASLEEP WHILE SITTING QUIETLY AFTER LUNCH WITHOUT ALCOHOL: 0
HOW LIKELY ARE YOU TO NOD OFF OR FALL ASLEEP WHILE SITTING INACTIVE IN A PUBLIC PLACE: 0
ESS TOTAL SCORE: 11

## 2021-03-12 NOTE — PROGRESS NOTES
Forest Cortes MD, Candy Martin MD, Deepak Montes MD, [facility]      [address]   [address], [address] [facility]   PH: [phone]  F: [phone]     Subjective:     Patient ID: Good Martinez is a 54 y.o. female, referred to the sleep center for consultation with a sleep specialist.     Reason for Consultation/Chief Complaint:   Chief Complaint   Patient presents with    Snoring       Referring physician:  Dr. Garret Schwab MD    Symptoms:   [x]  Snoring                                                                    []  Dry Mouth  []  Choking                                                                   []  Morning Headaches  []  Gasping for Air                                                        []  Trouble Falling asleep  [x]  Tired during the daytime                                         [x]  Trouble Staying Asleep  [x]  Tired when you wake up                                         [x]  Weight Gain in Last 5 Years  [x]  Wake up frequently at night                                    []  Weight Loss in Last 5 Years  []  Shortness Of Breath                                               [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  []  Coughing                                                                []  Smoker (Previous or Current)  []  Chest Pain                                                              []  Anxiety  []  Trouble keeping your legs still at night                   [x]  Depression  []  Kicking your legs in your sleep                               []  Insomnia            []  Other:     Significant Co-morbidities:  []  Congestive Heart Failure     []  COPD         []  Stroke (Past 30 Days)      []  Supplemental Oxygen Usage       []  Cognitive Impairment      []  Neuromuscular Problems  []  Epilepsy/Neurological Disorders         Duration of Sleep Problems:    History:    Social History by mouth daily 3/2/21  Yes Alyson Martin MD   buPROPion (WELLBUTRIN XL) 150 MG extended release tablet Take 1 tablet by mouth every morning 3/2/21  Yes Alyson Martin MD   Vitamins-Lipotropics (MULTI-VITAMIN HP/MINERALS PO) Take 1 tablet by mouth daily   Yes Historical Provider, MD   omeprazole (Ashkan Falcon) 40 MG delayed release capsule Take 1 capsule by mouth Daily 20  Alyson Martin MD       Allergies as of 2021    (No Known Allergies)       Patient Active Problem List   Diagnosis    Hypothyroidism    Hx of Graves' disease    Iron deficiency anemia    Gastroesophageal reflux disease without esophagitis    Weight gain    Left upper quadrant pain    Mixed hyperlipidemia    Obesity, Class III, BMI 40-49.9 (morbid obesity) (HCC)    Chronic pain of both knees    Snoring    Anemia    Hypersomnolence       Past Medical History:   Diagnosis Date    Endometriosis     Endometriosis of pelvis     Goiter 2001    Grave's disease     Pelvic inflammatory disease (PID)     Thyroid disease hypothyroid       Past Surgical History:   Procedure Laterality Date     SECTION      ENDOMETRIAL ABLATION      ENDOMETRIAL ABLATION      TUBAL LIGATION         Family History   Problem Relation Age of Onset    High Blood Pressure Mother     Substance Abuse Mother     Depression Mother     Substance Abuse Father          Objective:     Vitals:    21 1521   Weight: 246 lb (111.6 kg)   Height: 5' 2\" (1.575 m)     Body mass index is 44.99 kg/m². Neck -    Inches  [place] - Total score: 11    REVIEW OF SYSTEMS:  General: No distress. Constitutional: Negative for fever, no distress. HENT: Negative for sore throat, external appearance of ears and nose normal.   Eyes: Negative for redness. Respiratory: Negative for dyspnea, cough. Cardiovascular: Negative for chest pain. Gastrointestinal: Negative for vomiting, diarrhea.   Musculoskeletal: Negative for arthralgias. Skin: Negative for rash. Neurological: Negative for syncope. Mallampati Airway Classification:   []1 []2 []3 [x]4          Previous CPAP Usage:    [x]  Patient has never worn PAP. []  Patient has worn PAP previously but discontinued use. []  Current PAP user. Assessment:      Diagnosis:  EDS Snoring R/O [place]. Plan:     1. HST. 2. Sleep hygiene. 3. RTO 1 mthTasha Fierro is a 54 y.o. female being evaluated by a Virtual Visit (video visit) encounter to address concerns as mentioned above. A caregiver was present when appropriate. Due to this being a TeleHealth encounter (During PONGS-07 public health emergency), evaluation of the following organ systems was limited: Vitals/Constitutional/EENT/Resp/CV/GI//MS/Neuro/Skin/Heme-Lymph-Imm. Pursuant to the emergency declaration under the [address] [place] [address] and the anchor.travel and [employer] Act, this Virtual Visit was conducted with patient's (and/or legal guardian's) consent, to reduce the patient's risk of exposure to COVID-19 and provide necessary medical care. The patient (and/or legal guardian) has also been advised to contact this office for worsening conditions or problems, and seek emergency medical treatment and/or call 911 if deemed necessary. Patient identification was verified at the start of the visit: Yes    Services were provided through a video synchronous discussion virtually to substitute for in-person clinic visit. Patient and provider were located at their individual homes. --Carlton Olson MD on 3/12/2021 at 3:25 PM    An electronic signature was used to authenticate this note. No orders of the defined types were placed in this encounter.          Electronically signed by Carlton Olson MD on 3/12/2021 at 3:25 PM

## 2021-03-15 ENCOUNTER — HOSPITAL ENCOUNTER (OUTPATIENT)
Dept: SLEEP CENTER | Age: 46
Discharge: HOME | End: 2021-03-15
Payer: COMMERCIAL

## 2021-03-15 PROCEDURE — G0398 HOME SLEEP TEST/TYPE 2 PORTA: HCPCS

## 2021-03-15 NOTE — PROGRESS NOTES
Rena Calderón  1975  arrived at [address] on 3/15/2021 for set up and instruction of home sleep study with the [facility] unit.  she was instructed on proper set-up and operation of HST unit. Written instructions with set up diagram given for reference and reinforcement of verbal instruction and demonstration. she was able to return demonstration appropriately. No home environment, vision, dexterity, comprehension concerns with this patient based on completed forms and patient interactions. Patient will return unit after 2 nights as instructed.     Electronically signed by Santos Degroot on 3/15/2021 at 10:37 AM

## 2021-03-20 NOTE — PROGRESS NOTES
Results for the most recent sleep study on Nat Kirkpatrick  1975 are finalized and available. Please see media tab.     Electronically signed by Nadine Braswell on 3/19/2021 at 9:33 PM

## 2021-03-24 ASSESSMENT — ENCOUNTER SYMPTOMS
RESPIRATORY NEGATIVE: 1
GASTROINTESTINAL NEGATIVE: 1
EYES NEGATIVE: 1

## 2021-04-06 ENCOUNTER — OFFICE VISIT (OUTPATIENT)
Dept: ORTHOPEDIC SURGERY | Age: 46
End: 2021-04-06
Payer: COMMERCIAL

## 2021-04-06 VITALS — HEIGHT: 62 IN | WEIGHT: 246 LBS | BODY MASS INDEX: 45.27 KG/M2 | OXYGEN SATURATION: 98 % | HEART RATE: 71 BPM

## 2021-04-06 DIAGNOSIS — M17.12 PRIMARY OSTEOARTHRITIS OF LEFT KNEE: ICD-10-CM

## 2021-04-06 DIAGNOSIS — M17.11 PRIMARY OSTEOARTHRITIS OF RIGHT KNEE: ICD-10-CM

## 2021-04-06 PROCEDURE — 20610 DRAIN/INJ JOINT/BURSA W/O US: CPT | Performed by: PHYSICIAN ASSISTANT

## 2021-04-06 NOTE — PATIENT INSTRUCTIONS
Continue to weight bear as tolerated  Continue range of motion  Ice and elevate as needed  Tylenol or Motrin for pain  Injection given today in the office   Rest for 24-48 hours  Follow up 1 week for injection #2

## 2021-04-06 NOTE — PROGRESS NOTES
VISCO-SUPPLEMENTATION INJECTION (Number 1)  I reviewed and agree with the portions of the HPI, review of systems, vital documentation and plan performed by my staff and have added/addended where appropriate. HISTORY OF PRESENT ILLNESS (HPI):   Hellen Bowen is a 54y.o. year old female who is here for follow up for visco-supplementation injection number 1 for   1. Primary osteoarthritis of right knee    2. Primary osteoarthritis of left knee    . PAST HISTORY:   Unless otherwise specified in this note, the past history is reviewed today with the patient and is as follows-    No Known Allergies    Current Outpatient Medications   Medication Sig Dispense Refill    liothyronine (CYTOMEL) 5 MCG tablet TAKE 1 TABLET BY MOUTH DAILY 30 tablet 5    levothyroxine (SYNTHROID) 137 MCG tablet Take 1 tablet by mouth Daily Needs the synthroid 90 tablet 3    pravastatin (PRAVACHOL) 20 MG tablet Take 1 tablet by mouth daily 30 tablet 5    buPROPion (WELLBUTRIN XL) 150 MG extended release tablet Take 1 tablet by mouth every morning 30 tablet 3    omeprazole (PRILOSEC) 40 MG delayed release capsule Take 1 capsule by mouth Daily 90 capsule 3    Vitamins-Lipotropics (MULTI-VITAMIN HP/MINERALS PO) Take 1 tablet by mouth daily       No current facility-administered medications for this visit. PHYSICAL EXAM:   Pulse 71   Ht 5' 2\" (1.575 m)   Wt 246 lb (111.6 kg)   SpO2 98%   BMI 44.99 kg/m²     The right knee is examined:  Inspection:  Skin is intact. No erythema. Palpation:  No swelling or acute tenderness. Neuro/Vascular/Motor:  Sensation to light touch intact. Capillary refill brisk. No focal motor deficits. The left knee is examined:  Inspection:  Skin is intact. No erythema. Palpation:  No swelling or acute tenderness. Neuro/Vascular/Motor:  Sensation to light touch intact. Capillary refill brisk. No focal motor deficits. DIAGNOSIS:     1. Primary osteoarthritis of right knee    2.  Primary osteoarthritis of left knee       PLAN:  Continue to weight bear as tolerated  Continue range of motion  Ice and elevate as needed  Tylenol or Motrin for pain  Injection given today in the office   Rest for 24-48 hours  Follow up 1 week for injection #2    PROCEDURE:   Left Knee Aspiration / Injection Procedure:  Multiple treatment options were discussed. Joint injection was recommended. Details of the procedure, potential risks, and potential benefits were discussed. Patient's questions were answered. Patient elected to proceed with procedure. Medication: Orthovisc   NDC #: [id number]  Lot #:[id number]  Procedure:  Sterile technique was used as the skin over the injection site was prepped with alcohol. The left knee joint was then injected with the above listed medication. A sterile bandage was placed over the injection site. The patient tolerated the procedure well without complication. The patient is scheduled for the second injection in one week. Left Knee Aspiration / Injection Procedure:  Multiple treatment options were discussed. Joint injection was recommended. Details of the procedure, potential risks, and potential benefits were discussed. Patient's questions were answered. Patient elected to proceed with procedure. Medication: Orthovisc   NDC #: [id number]  Lot #:[id number]  Procedure:  Sterile technique was used as the skin over the injection site was prepped with alcohol. The left knee joint was then injected with the above listed medication. A sterile bandage was placed over the injection site. The patient tolerated the procedure well without complication. The patient is scheduled for the second injection in one week.

## 2021-04-12 ENCOUNTER — OFFICE VISIT (OUTPATIENT)
Dept: ORTHOPEDIC SURGERY | Age: 46
End: 2021-04-12
Payer: COMMERCIAL

## 2021-04-12 VITALS — RESPIRATION RATE: 18 BRPM | OXYGEN SATURATION: 99 % | HEART RATE: 87 BPM

## 2021-04-12 DIAGNOSIS — M17.12 PRIMARY OSTEOARTHRITIS OF LEFT KNEE: ICD-10-CM

## 2021-04-12 DIAGNOSIS — M17.11 PRIMARY OSTEOARTHRITIS OF RIGHT KNEE: Primary | ICD-10-CM

## 2021-04-12 PROCEDURE — 20610 DRAIN/INJ JOINT/BURSA W/O US: CPT | Performed by: PHYSICIAN ASSISTANT

## 2021-04-12 NOTE — PATIENT INSTRUCTIONS
Orthovisc # 2  Rest both knees for 24-48 hours  Work on ROM and strengthening of knees and legs   May take NSAIDS or anti-inflammatories as needed  Weightbearing as tolerated  Follow up in one week for 3rd injection

## 2021-04-12 NOTE — PROGRESS NOTES
refill brisk. No focal motor deficits. DIAGNOSIS:     1. Primary osteoarthritis of right knee    2. Primary osteoarthritis of left knee       PLAN:  Continue to weight bear as tolerated  Continue range of motion  Ice and elevate as needed  Tylenol or Motrin for pain  Injection given today in the office   Rest for 24-48 hours  Follow up 1 week for injection #3    PROCEDURE:   Left Knee Aspiration / Injection Procedure:  Multiple treatment options were discussed. Joint injection was recommended. Details of the procedure, potential risks, and potential benefits were discussed. Patient's questions were answered. Patient elected to proceed with procedure. Medication: Orthovisc   ND #: [id number]  Lot #:[id number]  Procedure:  Sterile technique was used as the skin over the injection site was prepped with alcohol. The left knee joint was then injected with the above listed medication. A sterile bandage was placed over the injection site. The patient tolerated the procedure well without complication. The patient is scheduled for the second injection in one week. Left Knee Aspiration / Injection Procedure:  Multiple treatment options were discussed. Joint injection was recommended. Details of the procedure, potential risks, and potential benefits were discussed. Patient's questions were answered. Patient elected to proceed with procedure. Medication: Orthovisc   ND #: [id number]  Lot #:[id number]  Procedure:  Sterile technique was used as the skin over the injection site was prepped with alcohol. The left knee joint was then injected with the above listed medication. A sterile bandage was placed over the injection site. The patient tolerated the procedure well without complication. The patient is scheduled for the second injection in one week.

## 2021-04-19 ENCOUNTER — OFFICE VISIT (OUTPATIENT)
Dept: ORTHOPEDIC SURGERY | Age: 46
End: 2021-04-19
Payer: COMMERCIAL

## 2021-04-19 VITALS — BODY MASS INDEX: 45.27 KG/M2 | RESPIRATION RATE: 16 BRPM | HEIGHT: 62 IN | WEIGHT: 246 LBS

## 2021-04-19 DIAGNOSIS — M17.11 PRIMARY OSTEOARTHRITIS OF RIGHT KNEE: ICD-10-CM

## 2021-04-19 DIAGNOSIS — M17.12 PRIMARY OSTEOARTHRITIS OF LEFT KNEE: ICD-10-CM

## 2021-04-19 PROCEDURE — 20610 DRAIN/INJ JOINT/BURSA W/O US: CPT | Performed by: PHYSICIAN ASSISTANT

## 2021-04-19 NOTE — PROGRESS NOTES
VISCO-SUPPLEMENTATION INJECTION (Number 3)  I reviewed and agree with the portions of the HPI, review of systems, vital documentation and plan performed by my staff and have added/addended where appropriate. HISTORY OF PRESENT ILLNESS (HPI):   Betsey Bates is a 54y.o. year old female who is here for follow up for visco-supplementation injection number 3 for   1. Primary osteoarthritis of left knee    2. Primary osteoarthritis of right knee    She states that she did notice some relief after the first injection. She actually states that she feels like the knees are more pain-free for the first time in a long time. PAST HISTORY:   Unless otherwise specified in this note, the past history is reviewed today with the patient and is as follows-    No Known Allergies    Current Outpatient Medications   Medication Sig Dispense Refill    liothyronine (CYTOMEL) 5 MCG tablet TAKE 1 TABLET BY MOUTH DAILY 30 tablet 5    levothyroxine (SYNTHROID) 137 MCG tablet Take 1 tablet by mouth Daily Needs the synthroid 90 tablet 3    pravastatin (PRAVACHOL) 20 MG tablet Take 1 tablet by mouth daily 30 tablet 5    omeprazole (PRILOSEC) 40 MG delayed release capsule Take 1 capsule by mouth Daily 90 capsule 3    Vitamins-Lipotropics (MULTI-VITAMIN HP/MINERALS PO) Take 1 tablet by mouth daily       No current facility-administered medications for this visit. PHYSICAL EXAM:   Resp 16   Ht 5' 2\" (1.575 m)   Wt 246 lb (111.6 kg)   BMI 44.99 kg/m²     The right knee is examined:  Inspection:  Skin is intact. No erythema. Palpation:  No swelling or acute tenderness. Neuro/Vascular/Motor:  Sensation to light touch intact. Capillary refill brisk. No focal motor deficits. The left knee is examined:  Inspection:  Skin is intact. No erythema. Palpation:  No swelling or acute tenderness. Neuro/Vascular/Motor:  Sensation to light touch intact. Capillary refill brisk. No focal motor deficits. DIAGNOSIS:     1.  Primary osteoarthritis of left knee    2. Primary osteoarthritis of right knee       PLAN:  Continue to weight bear as tolerated  Continue range of motion  Ice and elevate as needed  Tylenol or Motrin for pain  Injection given today in the office   Rest for 24-48 hours  Follow up as needed    PROCEDURE:   Left Knee Aspiration / Injection Procedure:  Multiple treatment options were discussed. Joint injection was recommended. Details of the procedure, potential risks, and potential benefits were discussed. Patient's questions were answered. Patient elected to proceed with procedure. Medication: Orthovisc   NDC #: [id number]  Lot #:[id number]  Procedure:  Sterile technique was used as the skin over the injection site was prepped with alcohol then injected with ethyl chloride spray. The left knee joint was then injected with the above listed medication. A sterile bandage was placed over the injection site. The patient tolerated the procedure well without complication. The patient is scheduled for the second injection in one week. Left Knee Aspiration / Injection Procedure:  Multiple treatment options were discussed. Joint injection was recommended. Details of the procedure, potential risks, and potential benefits were discussed. Patient's questions were answered. Patient elected to proceed with procedure. Medication: Orthovisc   NDC #: [id number]  Lot #:[id number]  Procedure:  Sterile technique was used as the skin over the injection site was prepped with alcohol then injected with ethyl chloride spray. The left knee joint was then injected with the above listed medication. A sterile bandage was placed over the injection site. The patient tolerated the procedure well without complication. The patient is scheduled for the second injection in one week.

## 2021-04-19 NOTE — PATIENT INSTRUCTIONS
Continue to weight bear as tolerated  Continue range of motion  Ice and elevate as needed  Tylenol or Motrin for pain  Orthovisc Injection given today in the office bilateral knees  Rest for 24-48 hours  Follow up as needed

## 2021-06-04 ENCOUNTER — OFFICE VISIT (OUTPATIENT)
Dept: FAMILY MEDICINE CLINIC | Age: 46
End: 2021-06-04
Payer: COMMERCIAL

## 2021-06-04 VITALS
WEIGHT: 238.4 LBS | BODY MASS INDEX: 43.6 KG/M2 | DIASTOLIC BLOOD PRESSURE: 82 MMHG | HEART RATE: 64 BPM | SYSTOLIC BLOOD PRESSURE: 124 MMHG | OXYGEN SATURATION: 98 %

## 2021-06-04 DIAGNOSIS — E03.9 ACQUIRED HYPOTHYROIDISM: Primary | ICD-10-CM

## 2021-06-04 DIAGNOSIS — E03.9 ACQUIRED HYPOTHYROIDISM: ICD-10-CM

## 2021-06-04 DIAGNOSIS — E78.2 MIXED HYPERLIPIDEMIA: ICD-10-CM

## 2021-06-04 DIAGNOSIS — G89.29 CHRONIC PAIN OF BOTH KNEES: ICD-10-CM

## 2021-06-04 DIAGNOSIS — M25.561 CHRONIC PAIN OF BOTH KNEES: ICD-10-CM

## 2021-06-04 DIAGNOSIS — K21.9 GASTROESOPHAGEAL REFLUX DISEASE WITHOUT ESOPHAGITIS: ICD-10-CM

## 2021-06-04 DIAGNOSIS — M25.562 CHRONIC PAIN OF BOTH KNEES: ICD-10-CM

## 2021-06-04 DIAGNOSIS — D50.9 IRON DEFICIENCY ANEMIA, UNSPECIFIED IRON DEFICIENCY ANEMIA TYPE: ICD-10-CM

## 2021-06-04 DIAGNOSIS — E66.01 OBESITY, CLASS III, BMI 40-49.9 (MORBID OBESITY) (HCC): ICD-10-CM

## 2021-06-04 LAB
CHOLESTEROL, TOTAL: 257 MG/DL (ref 0–199)
HDLC SERPL-MCNC: 63 MG/DL (ref 40–60)
LDL CHOLESTEROL CALCULATED: 136 MG/DL
T4 FREE: 0.7 NG/DL (ref 0.9–1.8)
TRIGL SERPL-MCNC: 288 MG/DL (ref 0–150)
TSH SERPL DL<=0.05 MIU/L-ACNC: 7.23 UIU/ML (ref 0.27–4.2)
VLDLC SERPL CALC-MCNC: 58 MG/DL

## 2021-06-04 PROCEDURE — 99214 OFFICE O/P EST MOD 30 MIN: CPT | Performed by: FAMILY MEDICINE

## 2021-06-04 RX ORDER — LIOTHYRONINE SODIUM 5 UG/1
5 TABLET ORAL DAILY
Qty: 30 TABLET | Refills: 5 | Status: CANCELLED | OUTPATIENT
Start: 2021-06-04

## 2021-06-04 RX ORDER — LEVOTHYROXINE SODIUM 137 UG/1
137 TABLET ORAL DAILY
Qty: 90 TABLET | Refills: 3 | Status: CANCELLED | OUTPATIENT
Start: 2021-06-04 | End: 2021-09-02

## 2021-06-04 RX ORDER — PRAVASTATIN SODIUM 20 MG
20 TABLET ORAL DAILY
Qty: 30 TABLET | Refills: 5 | Status: CANCELLED | OUTPATIENT
Start: 2021-06-04

## 2021-06-04 RX ORDER — OMEPRAZOLE 40 MG/1
40 CAPSULE, DELAYED RELEASE ORAL DAILY
Qty: 30 CAPSULE | Refills: 5 | Status: CANCELLED | OUTPATIENT
Start: 2021-06-04 | End: 2021-09-02

## 2021-06-04 ASSESSMENT — ENCOUNTER SYMPTOMS
COUGH: 0
SHORTNESS OF BREATH: 0

## 2021-06-04 NOTE — PROGRESS NOTES
Billee Head  20    Chief Complaint   Patient presents with    3 Month Follow-Up    Hypothyroidism    Gastroesophageal Reflux    Hyperlipidemia           Patient here for 3 months f/u regarding GERD, HLD, hypothyroidism, and anemia, and  needs medication refill, doing fine and she is taking her thyroid medication every day, her TSH is still not under control, her GERD under control too. Patient  taking her cholesterol medication, with no side effects. knees pain is getting better after she got the injection in both knees, she already have the sleep study and came back negative for sleep apnea. Lost weight and she feeling much better. Past Medical History:   Diagnosis Date    Endometriosis     Endometriosis of pelvis     Goiter 2001    Grave's disease     Pelvic inflammatory disease (PID)     Thyroid disease hypothyroid     Past Surgical History:   Procedure Laterality Date     SECTION      ENDOMETRIAL ABLATION      ENDOMETRIAL ABLATION  2011    TUBAL LIGATION       Family History   Problem Relation Age of Onset    High Blood Pressure Mother     Substance Abuse Mother     Depression Mother     Substance Abuse Father      Social History     Socioeconomic History    Marital status:      Spouse name: Not on file    Number of children: Not on file    Years of education: Associates    Highest education level: Not on file   Occupational History    Occupation: RN   Tobacco Use    Smoking status: Former Smoker     Packs/day: 0.25     Years: 18.00     Pack years: 4.50     Types: Cigarettes     Quit date:      Years since quittin.4    Smokeless tobacco: Never Used   Substance and Sexual Activity    Alcohol use:  Yes     Alcohol/week: 5.0 standard drinks     Types: 5 Glasses of wine per week     Comment: 3 x a week    Drug use: No    Sexual activity: Yes     Partners: Male   Other Topics Concern    Not on file   Social History Narrative  Not on file     Social Determinants of Health     Financial Resource Strain:     Difficulty of Paying Living Expenses:    Food Insecurity:     Worried About Running Out of Food in the Last Year:     [address] in the Last Year:    Transportation Needs:     Lack of Transportation (Medical):  Lack of Transportation (Non-Medical):    Physical Activity:     Days of Exercise per Week:     Minutes of Exercise per Session:    Stress:     Feeling of Stress :    Social Connections:     Frequency of Communication with Friends and Family:     Frequency of Social Gatherings with Friends and Family:     Attends Taoist Services:     Active Member of Clubs or Organizations:     Attends Club or Organization Meetings:     Marital Status:    Intimate Partner Violence:     Fear of Current or Ex-Partner:     Emotionally Abused:     Physically Abused:     Sexually Abused: Allergies   Allergen Reactions    Bupropion Anxiety and Other (See Comments)     Current Outpatient Medications   Medication Sig Dispense Refill    omeprazole (PRILOSEC) 40 MG delayed release capsule TAKE 1 CAPSULE BY MOUTH DAILY 30 capsule 5    liothyronine (CYTOMEL) 5 MCG tablet TAKE 1 TABLET BY MOUTH DAILY 30 tablet 5    pravastatin (PRAVACHOL) 20 MG tablet Take 1 tablet by mouth daily 30 tablet 5    Vitamins-Lipotropics (MULTI-VITAMIN HP/MINERALS PO) Take 1 tablet by mouth daily      levothyroxine (SYNTHROID) 137 MCG tablet Take 1 tablet by mouth Daily Needs the synthroid 90 tablet 3     No current facility-administered medications for this visit. Review of Systems   Constitutional: Negative for activity change, chills and fever. HENT: Negative for congestion. Respiratory: Negative for cough and shortness of breath. Cardiovascular: Negative for chest pain and leg swelling. Genitourinary: Negative for dysuria and frequency. Musculoskeletal: Positive for arthralgias (Both knees pain getting better). Negative for gait problem. Neurological: Negative for dizziness and headaches. Psychiatric/Behavioral: Negative for agitation and sleep disturbance. The patient is not nervous/anxious. Lab Results   Component Value Date    WBC 6.2 02/04/2021    HGB 11.5 (L) 02/04/2021    HCT 39.3 02/04/2021    MCV 79.7 02/04/2021     02/04/2021     Lab Results   Component Value Date     (L) 02/20/2020    K 4.1 02/20/2020    CL 94 (L) 02/20/2020    CO2 26 02/20/2020    BUN 15 02/20/2020    CREATININE 1.1 02/20/2020    GLUCOSE 88 02/20/2020    CALCIUM 9.7 02/20/2020    PROT 7.9 02/20/2020    LABALBU 4.4 02/20/2020    BILITOT 0.2 02/20/2020    ALKPHOS 85 02/20/2020    AST 23 02/20/2020    ALT 17 02/20/2020    LABGLOM 54 (L) 02/20/2020    GFRAA >60 02/20/2020    AGRATIO 1.4 04/18/2018    GLOB 3.2 04/18/2018     Lab Results   Component Value Date    CHOL 261 (H) 02/04/2021    CHOL 232 (H) 02/20/2020    CHOL 181 08/09/2018     Lab Results   Component Value Date    TRIG 332 (H) 02/04/2021    TRIG 293 (H) 02/20/2020    TRIG 178 (H) 08/09/2018     Lab Results   Component Value Date    HDL 51 02/04/2021    HDL 56 02/20/2020    HDL 49 08/09/2018     Lab Results   Component Value Date    LDLCALC 96 08/09/2018     No results found for: LABA1C  Lab Results   Component Value Date    TSH 0.12 (L) 08/09/2018    TSHHS 6.280 (H) 02/04/2021         /82 (Site: Left Upper Arm, Position: Sitting, Cuff Size: Large Adult)   Pulse 64   Wt 238 lb 6.4 oz (108.1 kg)   SpO2 98%   BMI 43.60 kg/m²     BP Readings from Last 3 Encounters:   06/04/21 124/82   03/02/21 116/78   08/28/20 122/70       Wt Readings from Last 3 Encounters:   06/04/21 238 lb 6.4 oz (108.1 kg)   04/19/21 246 lb (111.6 kg)   04/06/21 246 lb (111.6 kg)         Physical Exam  Constitutional:       General: She is not in acute distress. Appearance: Normal appearance. She is well-developed. She is obese. She is not ill-appearing or diaphoretic.    HENT: Head: Normocephalic and atraumatic. Eyes:      General: No scleral icterus. Extraocular Movements: Extraocular movements intact. Pupils: Pupils are equal, round, and reactive to light. Cardiovascular:      Rate and Rhythm: Normal rate and regular rhythm. Heart sounds: No murmur heard. Pulmonary:      Effort: Pulmonary effort is normal.      Breath sounds: Normal breath sounds. No wheezing or rales. Musculoskeletal:         General: Normal range of motion. Cervical back: Normal range of motion and neck supple. No rigidity. No muscular tenderness. Right lower leg: No edema. Left lower leg: No edema. Neurological:      General: No focal deficit present. Mental Status: She is alert and oriented to person, place, and time. Psychiatric:         Mood and Affect: Mood normal.         Behavior: Behavior normal.         ASSESSMENT/ PLAN:    1. Acquired hypothyroidism  -We will recheck her blood work  - TSH without Reflex; Future  - T4, Free; Future    2. Mixed hyperlipidemia  -She is on medication  - Lipid Panel; Future    3. Gastroesophageal reflux disease without esophagitis  -Under control she is taking the omeprazole every other day or every 2 days    4. Chronic pain of both knees  -Getting better after she had the injection in both knees    5. Iron deficiency anemia, unspecified iron deficiency anemia type  -Stable, chronic    6. Obesity, Class III, BMI 40-49.9 (morbid obesity) ([facility] [address]  -Encourage lose weight  -He is working on that and she did lose some weight              - All old blood work reviewed with the patient  - Appropriate prescription are addressed. - After visit summery provided. - Questions answered and patient verbalizes understanding.  - Call for any problem, questions, or concerns. Return in about 6 months (around 12/4/2021).

## 2021-06-05 DIAGNOSIS — K21.9 GASTROESOPHAGEAL REFLUX DISEASE WITHOUT ESOPHAGITIS: ICD-10-CM

## 2021-06-05 DIAGNOSIS — E03.9 ACQUIRED HYPOTHYROIDISM: ICD-10-CM

## 2021-06-05 DIAGNOSIS — E78.2 MIXED HYPERLIPIDEMIA: ICD-10-CM

## 2021-06-05 RX ORDER — PRAVASTATIN SODIUM 20 MG
20 TABLET ORAL DAILY
Qty: 30 TABLET | Refills: 5 | Status: SHIPPED | OUTPATIENT
Start: 2021-06-05 | End: 2021-12-27 | Stop reason: SDUPTHER

## 2021-06-05 RX ORDER — LEVOTHYROXINE SODIUM 137 UG/1
137 TABLET ORAL DAILY
Qty: 30 TABLET | Refills: 5 | Status: SHIPPED | OUTPATIENT
Start: 2021-06-05 | End: 2021-12-27 | Stop reason: SDUPTHER

## 2021-06-05 RX ORDER — LIOTHYRONINE SODIUM 5 UG/1
5 TABLET ORAL DAILY
Qty: 30 TABLET | Refills: 5 | Status: SHIPPED | OUTPATIENT
Start: 2021-06-05 | End: 2021-12-27 | Stop reason: SDUPTHER

## 2021-08-09 ENCOUNTER — APPOINTMENT (OUTPATIENT)
Dept: GENERAL RADIOLOGY | Age: 46
End: 2021-08-09
Payer: COMMERCIAL

## 2021-08-09 PROCEDURE — 71046 X-RAY EXAM CHEST 2 VIEWS: CPT

## 2021-08-09 PROCEDURE — 99283 EMERGENCY DEPT VISIT LOW MDM: CPT

## 2021-08-09 ASSESSMENT — PAIN SCALES - GENERAL: PAINLEVEL_OUTOF10: 5

## 2021-08-09 ASSESSMENT — PAIN DESCRIPTION - PAIN TYPE: TYPE: ACUTE PAIN

## 2021-08-09 ASSESSMENT — PAIN DESCRIPTION - LOCATION: LOCATION: CHEST

## 2021-08-10 ENCOUNTER — HOSPITAL ENCOUNTER (EMERGENCY)
Age: 46
Discharge: HOME | End: 2021-08-10
Attending: EMERGENCY MEDICINE
Payer: COMMERCIAL

## 2021-08-10 VITALS
RESPIRATION RATE: 18 BRPM | HEIGHT: 62 IN | TEMPERATURE: 98.3 F | HEART RATE: 61 BPM | SYSTOLIC BLOOD PRESSURE: 149 MMHG | DIASTOLIC BLOOD PRESSURE: 97 MMHG | WEIGHT: 236 LBS | OXYGEN SATURATION: 96 % | BODY MASS INDEX: 43.43 KG/M2

## 2021-08-10 DIAGNOSIS — R07.9 CHEST PAIN, UNSPECIFIED TYPE: Primary | ICD-10-CM

## 2021-08-10 DIAGNOSIS — R03.0 ELEVATED BLOOD PRESSURE READING: ICD-10-CM

## 2021-08-10 LAB
ANION GAP SERPL CALCULATED.3IONS-SCNC: 10 MMOL/L (ref 4–16)
BASOPHILS ABSOLUTE: 0 K/CU MM
BASOPHILS RELATIVE PERCENT: 0.2 % (ref 0–1)
BUN BLDV-MCNC: 15 MG/DL (ref 6–23)
CALCIUM SERPL-MCNC: 9 MG/DL (ref 8.3–10.6)
CHLORIDE BLD-SCNC: 96 MMOL/L (ref 99–110)
CO2: 28 MMOL/L (ref 21–32)
CREAT SERPL-MCNC: 1 MG/DL (ref 0.6–1.1)
DIFFERENTIAL TYPE: ABNORMAL
EKG ATRIAL RATE: 60 BPM
EKG DIAGNOSIS: NORMAL
EKG P AXIS: 86 DEGREES
EKG P-R INTERVAL: 166 MS
EKG Q-T INTERVAL: 432 MS
EKG QRS DURATION: 90 MS
EKG QTC CALCULATION (BAZETT): 432 MS
EKG R AXIS: -17 DEGREES
EKG T AXIS: 9 DEGREES
EKG VENTRICULAR RATE: 60 BPM
EOSINOPHILS ABSOLUTE: 0.1 K/CU MM
EOSINOPHILS RELATIVE PERCENT: 1.2 % (ref 0–3)
GFR AFRICAN AMERICAN: >60 ML/MIN/1.73M2
GFR NON-AFRICAN AMERICAN: 60 ML/MIN/1.73M2
GLUCOSE BLD-MCNC: 92 MG/DL (ref 70–99)
HCT VFR BLD CALC: 38.4 % (ref 37–47)
HEMOGLOBIN: 11.5 GM/DL (ref 12.5–16)
IMMATURE NEUTROPHIL %: 0.5 % (ref 0–0.43)
LYMPHOCYTES ABSOLUTE: 2.3 K/CU MM
LYMPHOCYTES RELATIVE PERCENT: 24.8 % (ref 24–44)
MCH RBC QN AUTO: 23.8 PG (ref 27–31)
MCHC RBC AUTO-ENTMCNC: 29.9 % (ref 32–36)
MCV RBC AUTO: 79.3 FL (ref 78–100)
MONOCYTES ABSOLUTE: 0.5 K/CU MM
MONOCYTES RELATIVE PERCENT: 5.1 % (ref 0–4)
NUCLEATED RBC %: 0 %
PDW BLD-RTO: 15.5 % (ref 11.7–14.9)
PLATELET # BLD: 292 K/CU MM (ref 140–440)
PMV BLD AUTO: 10.4 FL (ref 7.5–11.1)
POTASSIUM SERPL-SCNC: 3.6 MMOL/L (ref 3.5–5.1)
PRO-BNP: 37.64 PG/ML
RBC # BLD: 4.84 M/CU MM (ref 4.2–5.4)
SEGMENTED NEUTROPHILS ABSOLUTE COUNT: 6.4 K/CU MM
SEGMENTED NEUTROPHILS RELATIVE PERCENT: 68.2 % (ref 36–66)
SODIUM BLD-SCNC: 134 MMOL/L (ref 135–145)
TOTAL IMMATURE NEUTOROPHIL: 0.05 K/CU MM
TOTAL NUCLEATED RBC: 0 K/CU MM
TROPONIN T: <0.01 NG/ML
WBC # BLD: 9.4 K/CU MM (ref 4–10.5)

## 2021-08-10 PROCEDURE — 93010 ELECTROCARDIOGRAM REPORT: CPT | Performed by: INTERNAL MEDICINE

## 2021-08-10 PROCEDURE — 85025 COMPLETE CBC W/AUTO DIFF WBC: CPT

## 2021-08-10 PROCEDURE — 80048 BASIC METABOLIC PNL TOTAL CA: CPT

## 2021-08-10 PROCEDURE — 84484 ASSAY OF TROPONIN QUANT: CPT

## 2021-08-10 PROCEDURE — 83880 ASSAY OF NATRIURETIC PEPTIDE: CPT

## 2021-08-10 PROCEDURE — 93005 ELECTROCARDIOGRAM TRACING: CPT | Performed by: EMERGENCY MEDICINE

## 2021-08-10 NOTE — ED PROVIDER NOTES
CHIEF COMPLAINT  Chief Complaint   Patient presents with    Chest Pain     x 1 day    Hypertension       HPI  Lamar Roberts is a 54 y.o. female with history of hypothyroid who presents with chest pain that is left-sided, aching and persistent over the past 27 hours. Last night when she was attempting to sleep she felt some aching, took an over-the-counter medication and had improvement. When she woke up this morning the discomfort recurred. No rashes, no breast skin changes, breast masses, nipple discharge. No cough, fever, shortness of breath. No trauma to the chest.  Pain does not worsen on range of motion or palpation of the pectoral region. No associated leg swelling or history of DVT or PE. Denies any history of cardiac disease, does have family history of cardiac disease, father had MI at age 61. Denies any diabetes or hypertension. States her blood pressure is usually 90/60. No fevers or Covid exposure. Has been vaccinated x2.       REVIEW OF SYSTEMS  Review of Systems   History obtained from chart review and the patient  General ROS: negative for - chills or fever  Ophthalmic ROS: negative for - decreased vision or double vision  ENT ROS: negative for - headaches  Hematological and Lymphatic ROS: negative for - bleeding problems or blood clots  Endocrine ROS: negative for - unexpected weight changes  Respiratory ROS: no cough, shortness of breath, or wheezing  Cardiovascular ROS: positive for - chest pain  Gastrointestinal ROS: no abdominal pain, change in bowel habits, or black or bloody stools  Genito-Urinary ROS: no dysuria, trouble voiding, or hematuria  Musculoskeletal ROS: negative for - joint stiffness or joint swelling  Neurological ROS: no TIA or stroke symptoms      PAST MEDICAL HISTORY  Past Medical History:   Diagnosis Date    Endometriosis     Endometriosis of pelvis 2011    Goiter 2001    Grave's disease 2000    Pelvic inflammatory disease (PID) 1992    Thyroid disease hypothyroid       FAMILY HISTORY  Family History   Problem Relation Age of Onset    High Blood Pressure Mother     Substance Abuse Mother     Depression Mother     Substance Abuse Father        SOCIAL HISTORY  Social History     Socioeconomic History    Marital status:      Spouse name: None    Number of children: None    Years of education: Associates    Highest education level: None   Occupational History    Occupation: RN   Tobacco Use    Smoking status: Former Smoker     Packs/day: 0.25     Years: 18.00     Pack years: 4.50     Types: Cigarettes     Quit date:      Years since quittin.6    Smokeless tobacco: Never Used   Substance and Sexual Activity    Alcohol use: Yes     Alcohol/week: 5.0 standard drinks     Types: 5 Glasses of wine per week     Comment: 3 x a week    Drug use: No    Sexual activity: Yes     Partners: Male   Other Topics Concern    None   Social History Narrative    None     Social Determinants of Health     Financial Resource Strain:     Difficulty of Paying Living Expenses:    Food Insecurity:     Worried About Running Out of Food in the Last Year:     Ran Out of Food in the Last Year:    Transportation Needs:     Lack of Transportation (Medical):      Lack of Transportation (Non-Medical):    Physical Activity:     Days of Exercise per Week:     Minutes of Exercise per Session:    Stress:     Feeling of Stress :    Social Connections:     Frequency of Communication with Friends and Family:     Frequency of Social Gatherings with Friends and Family:     Attends Congregation Services:     Active Member of Clubs or Organizations:     Attends Club or Organization Meetings:     Marital Status:    Intimate Partner Violence:     Fear of Current or Ex-Partner:     Emotionally Abused:     Physically Abused:     Sexually Abused:        SURGICAL HISTORY  Past Surgical History:   Procedure Laterality Date     SECTION     [address]  ENDOMETRIAL ABLATION  2011    TUBAL LIGATION         CURRENT MEDICATIONS  No current facility-administered medications on file prior to encounter. Current Outpatient Medications on File Prior to Encounter   Medication Sig Dispense Refill    liothyronine (CYTOMEL) 5 MCG tablet Take 1 tablet by mouth daily 30 tablet 5    levothyroxine (SYNTHROID) 137 MCG tablet Take 1 tablet by mouth Daily Needs the synthroid 30 tablet 5    pravastatin (PRAVACHOL) 20 MG tablet Take 1 tablet by mouth daily 30 tablet 5    omeprazole (PRILOSEC) 40 MG delayed release capsule TAKE 1 CAPSULE BY MOUTH DAILY 30 capsule 5    Vitamins-Lipotropics (MULTI-VITAMIN HP/MINERALS PO) Take 1 tablet by mouth daily           ALLERGIES  Allergies   Allergen Reactions    Bupropion Anxiety and Other (See Comments)       PHYSICAL EXAM  VITAL SIGNS: BP (!) 149/97   Pulse 61   Temp 98.3 °F (36.8 °C) (Oral)   Resp 18   Ht 5' 2\" (1.575 m)   Wt 236 lb (107 kg)   SpO2 96%   BMI 43.16 kg/m²   Constitutional: Well developed, Well nourished, resting in bed, pleasant  HENT: Normocephalic, Atraumatic, Bilateral external ears normal, mask in place per recommendations  Eyes: PERRL, EOMI, Conjunctiva normal, No discharge. Neck: Normal range of motion, Supple, No stridor. Cardiovascular: Normal heart rate, Normal rhythm, No murmurs, No rubs, No gallops. Thorax & Lungs: Normal breath sounds, No respiratory distress, No wheezing, No chest tenderness. Abdomen: Bowel sounds normal, Soft, No tenderness, no guarding, no rebound, No masses, No pulsatile masses. Skin: Warm, Dry, No erythema, No rash. Extremities: Intact distal pulses, No edema, No tenderness, No cyanosis, No clubbing. No palpable cord or fullness overlying the vein sheath  Musculoskeletal: Good gross range of motion in all major joints. No major deformities noted.    Neurologic: Alert & oriented x 3, Normal gross motor function, Normal gross sensory function, No focal deficits noted.   Psychiatric: Affect normal    EKG  EKG Interpretation    Interpreted by emergency department physician from August 10 at 00 11    Rhythm: normal sinus   Rate: normal  Axis: left  Ectopy: none  Conduction: normal  ST Segments: no acute change  T Waves: no acute change  Q Waves: none    Clinical Impression: Normal sinus rhythm with a rate of 60 and a QTC of 432 with no clear ischemia or ectopy    José Miguel Costa MD      RADIOLOGY/PROCEDURES/LABS  Last Imaging results   XR CHEST (2 VW)   Final Result   Stable chest with no acute abnormality seen. Imaging reviewed by myself    Labs Reviewed   CBC WITH AUTO DIFFERENTIAL - Abnormal; Notable for the following components:       Result Value    Hemoglobin 11.5 (*)     MCH 23.8 (*)     MCHC 29.9 (*)     RDW 15.5 (*)     Segs Relative 68.2 (*)     Monocytes % 5.1 (*)     Immature Neutrophil % 0.5 (*)     All other components within normal limits   BASIC METABOLIC PANEL - Abnormal; Notable for the following components:    Sodium 134 (*)     Chloride 96 (*)     GFR Non- 60 (*)     All other components within normal limits   TROPONIN   BRAIN NATRIURETIC PEPTIDE         Medications - No data to display    [address] [facility]  Pertinent Labs & Imaging studies reviewed. (See chart for details)    58-year-old female presents with throbbing, aching chest pain. Skin check with no shingles, denying any breast symptoms, denying any musculoskeletal symptoms. Chest x-ray without pneumothorax, pneumonia or suggestions of CHF. EKG and troponin nonischemic, signs and symptoms constant over 24 hours, 1 troponin sufficient for ACS evaluation. Heart score low risk. PERC negative, PE unlikely. Will discharge to follow with cardiology for recheck, strict return precautions put in to place, discharged in stable condition.     Patient comfortable with plan of care, strict return precautions put into place, discharged in stable

## 2021-08-27 ENCOUNTER — OFFICE VISIT (OUTPATIENT)
Dept: ORTHOPEDIC SURGERY | Age: 46
End: 2021-08-27
Payer: COMMERCIAL

## 2021-08-27 VITALS — HEIGHT: 62 IN | BODY MASS INDEX: 42.69 KG/M2 | WEIGHT: 232 LBS | RESPIRATION RATE: 16 BRPM

## 2021-08-27 DIAGNOSIS — M17.11 PRIMARY OSTEOARTHRITIS OF RIGHT KNEE: Primary | ICD-10-CM

## 2021-08-27 DIAGNOSIS — M17.12 PRIMARY OSTEOARTHRITIS OF LEFT KNEE: ICD-10-CM

## 2021-08-27 PROCEDURE — 20610 DRAIN/INJ JOINT/BURSA W/O US: CPT | Performed by: PHYSICIAN ASSISTANT

## 2021-08-27 PROCEDURE — 99213 OFFICE O/P EST LOW 20 MIN: CPT | Performed by: PHYSICIAN ASSISTANT

## 2021-08-27 ASSESSMENT — ENCOUNTER SYMPTOMS
GASTROINTESTINAL NEGATIVE: 1
RESPIRATORY NEGATIVE: 1
EYES NEGATIVE: 1

## 2021-08-27 NOTE — PROGRESS NOTES
Review of Systems   Constitutional: Negative. HENT: Negative. Eyes: Negative. Respiratory: Negative. Cardiovascular: Negative. Gastrointestinal: Negative. Genitourinary: Negative. Musculoskeletal: Positive for arthralgias and joint swelling. Skin: Negative. Neurological: Negative. Psychiatric/Behavioral: Negative. HPI:  Jaren Romeo is a 54 y.o. female that returns to the office to address her bilateral knee pain with pain worse within the right knee with pain described as a \"crushing\" type sensation throughout the patella of the bilateral extremity. She completed visco-supplementation series on 21 with relief provided for 1-2 weeks before her symptoms returned. Pain is rated at a 5/10. She is having instability within the right knee, swelling of the bilateral knee, popping, and cracking which is aggravated by ambulation and stair climbing. She has been taking Advil for symptom relief which helps but symptoms remain constant. She denies injury, surgery, or other conservative therapies and wishes to discuss next step.      Past Medical History:   Diagnosis Date    Endometriosis     Endometriosis of pelvis     Goiter 2001    Grave's disease     Pelvic inflammatory disease (PID)     Thyroid disease hypothyroid       Past Surgical History:   Procedure Laterality Date     SECTION      ENDOMETRIAL ABLATION      ENDOMETRIAL ABLATION      TUBAL LIGATION         Family History   Problem Relation Age of Onset    High Blood Pressure Mother     Substance Abuse Mother     Depression Mother     Substance Abuse Father        Social History     Socioeconomic History    Marital status:      Spouse name: None    Number of children: None    Years of education: Associates    Highest education level: None   Occupational History    Occupation: RN   Tobacco Use    Smoking status: Former Smoker     Packs/day: 0.25     Years: 18.00     Pack years: 4.50     Types: Cigarettes     Quit date:      Years since quittin.6    Smokeless tobacco: Never Used   Substance and Sexual Activity    Alcohol use: Yes     Alcohol/week: 5.0 standard drinks     Types: 5 Glasses of wine per week     Comment: 3 x a week    Drug use: No    Sexual activity: Yes     Partners: Male   Other Topics Concern    None   Social History Narrative    None     Social Determinants of Health     Financial Resource Strain:     Difficulty of Paying Living Expenses:    Food Insecurity:     Worried About Running Out of Food in the Last Year:     Ran Out of Food in the Last Year:    Transportation Needs:     Lack of Transportation (Medical):  Lack of Transportation (Non-Medical):    Physical Activity:     Days of Exercise per Week:     Minutes of Exercise per Session:    Stress:     Feeling of Stress :    Social Connections:     Frequency of Communication with Friends and Family:     Frequency of Social Gatherings with Friends and Family:     Attends Mosque Services:     Active Member of Clubs or Organizations:     Attends Club or Organization Meetings:     Marital Status:    Intimate Partner Violence:     Fear of Current or Ex-Partner:     Emotionally Abused:     Physically Abused:     Sexually Abused:        Current Outpatient Medications   Medication Sig Dispense Refill    liothyronine (CYTOMEL) 5 MCG tablet Take 1 tablet by mouth daily 30 tablet 5    levothyroxine (SYNTHROID) 137 MCG tablet Take 1 tablet by mouth Daily Needs the synthroid 30 tablet 5    pravastatin (PRAVACHOL) 20 MG tablet Take 1 tablet by mouth daily 30 tablet 5    Vitamins-Lipotropics (MULTI-VITAMIN HP/MINERALS PO) Take 1 tablet by mouth daily      omeprazole (PRILOSEC) 40 MG delayed release capsule TAKE 1 CAPSULE BY MOUTH DAILY 30 capsule 5     No current facility-administered medications for this visit.        Allergies   Allergen Reactions    Bupropion Anxiety and Other (See Comments)       Review of Systems:  See above      Physical Exam:   Resp 16   Ht 5' 2\" (1.575 m)   Wt 232 lb (105.2 kg)   BMI 42.43 kg/m²        Gait is Antalgic. The patient can bear weight on the injured extremity. Gen/Psych:Examination reveals a pleasant individual in no acute distress. The patient is oriented to time, place and person. The patient's mood and affect are appropriate. Patient appears well nourished. Body habitus is obese     Lymph:  no lymphedema in bilateral lower extremities     Skin intact in bilateral lower extremities with no ulcerations, lesions, rash, erythema. Vascular: There are no varicosities in bilateral lower extremities, sensation intact to light touch over bilateral lower extremities. bilateral leg/knee exam:  Leg alignment:     neutral  Quadriceps/hamstring atrophy:   no  Knee effusion:    no   Knee erythema:   no  ROM:     Full, 0-120 degrees  Varus laxity at 0 and 30 deg's: no  Valguslaxity at 0 and 30 deg's: no  Recurvatum:    no  Tenderness at:   Patellofemoral   Moderate crepitus with flexion and extension. Bilateral Knee strength is 5/5 flexion and extension  There is + L2-S1 motor and sensory function in bilateral lower extremities    Outside record review: office notes         Impression:  bilateral knee DJD      Plan:    Patient Instructions   Bilateral steroid injection  Rest the bilateral knee for 24-48 hours  May take Advil as needed  Rest, ice, and elevate as needed  PT ordered  Work on ROM and strengthening exercises per PT protocol  Weightbearing and activities as tolerated  Follow up in 6 weeks    Right knee injection procedure note    Pre-procedure diagnosis:  Right knee DJD    Post-procedure diagnosis:  same    Procedure: The planned procedure/risks/benefits/alternatives were discussed with the patient.   Risks include, but are not limited to, increased pain, drug reaction, infection, bleeding, lack of improvement, neurovascular injury, and increased blood sugar levels. The patient understood and all of their questions were answered. The right knee was prepped with alcohol, then a 22 gauge needle was advanced into the inferior-lateral joint without difficulty. The joint was then injected with 1 ml 1% lidocaine, 1ml of Kenalog (40 mg/ml), 1ml of 0.5% bupivacaine. The injection site was cleansed with isopropyl alcohol and a band-aid was placed. Complications:  None, the patient tolerated the procedure well. Instructions: The patient was advised to rest the knee and decrease activity for the next 24 to 48 hours. May use prescription or OTC pain relievers as needed for any post-injection pain as well as ice. Left knee injection procedure note    Pre-procedure diagnosis:  Left knee DJD    Post-procedure diagnosis:  same    Procedure: The planned procedure/risks/benefits/alternatives were discussed with the patient. Risks include, but are not limited to, increased pain, drug reaction, infection, bleeding, lack of improvement, neurovascular injury, and increased blood sugar levels. The patient understood and all of their questions were answered. The Left knee was prepped with alcohol then a 22 gauge needle was advanced into the inferior-lateral joint without difficulty. The joint was then injected with 1 ml 1% lidocaine, 1ml of Kenalog (40 mg/ml), 1ml 0.5% bupivacaine the injection site was cleansed with isopropyl alcohol and a band-aid was placed. Complications:  None, the patient tolerated the procedure well. Instructions: The patient was advised to rest the knee and decrease activity for the next 24 to 48 hours. May use prescription or OTC pain relievers as needed for any post-injection pain as well as ice.

## 2021-08-27 NOTE — PATIENT INSTRUCTIONS
Bilateral steroid injection  Rest the bilateral knee for 24-48 hours  May take Advil as needed  Rest, ice, and elevate as needed  PT ordered  Work on ROM and strengthening exercises per PT protocol  Weightbearing and activities as tolerated  Follow up in 6 weeks

## 2021-09-09 ENCOUNTER — TELEPHONE (OUTPATIENT)
Dept: FAMILY MEDICINE CLINIC | Age: 46
End: 2021-09-09

## 2021-09-26 ENCOUNTER — HOSPITAL ENCOUNTER (EMERGENCY)
Age: 46
Discharge: LEFT BEFORE DISCHARGE | End: 2021-09-26
Payer: COMMERCIAL

## 2021-09-26 VITALS
WEIGHT: 213 LBS | HEIGHT: 62 IN | DIASTOLIC BLOOD PRESSURE: 95 MMHG | SYSTOLIC BLOOD PRESSURE: 131 MMHG | BODY MASS INDEX: 39.2 KG/M2 | HEART RATE: 65 BPM | OXYGEN SATURATION: 96 % | RESPIRATION RATE: 18 BRPM | TEMPERATURE: 98.1 F

## 2021-09-26 DIAGNOSIS — M25.511 ACUTE PAIN OF RIGHT SHOULDER: Primary | ICD-10-CM

## 2021-09-26 PROCEDURE — 99283 EMERGENCY DEPT VISIT LOW MDM: CPT

## 2021-09-26 PROCEDURE — 99282 EMERGENCY DEPT VISIT SF MDM: CPT

## 2021-09-26 ASSESSMENT — PAIN DESCRIPTION - FREQUENCY: FREQUENCY: CONTINUOUS

## 2021-09-26 ASSESSMENT — PAIN DESCRIPTION - LOCATION: LOCATION: ARM

## 2021-09-26 ASSESSMENT — PAIN DESCRIPTION - PAIN TYPE: TYPE: ACUTE PAIN

## 2021-09-26 ASSESSMENT — PAIN SCALES - GENERAL: PAINLEVEL_OUTOF10: 7

## 2021-09-26 ASSESSMENT — PAIN DESCRIPTION - ORIENTATION: ORIENTATION: RIGHT

## 2021-09-26 NOTE — ED NOTES
This RN witnessed patient departing facility prior to registration and receiving discharge paperwork. [address]  Kallie Pyle  09/26/21 [id number]

## 2021-09-26 NOTE — ED PROVIDER NOTES
Patient Identification  [place] is a 54 y.o. female    Chief Complaint  Arm Pain (limited range of motion)      HPI  (History provided by patient)  This is a 54 y.o. female who was brought in by self for chief complaint of arm pain. Patient noticed she began having spontaneous right shoulder and bicep pain yesterday. States that she tried stretching shoulder with no relief, went to sleep, woke up today with worse pain in the right anterior shoulder and right bicep. It is worse with flexion of the elbow and range of motion of the shoulder. Denies any previous shoulder problems. No specific trauma. Presents the ED because she can is concerned she may have broken her right shoulder. No chest pain or shortness of breath. Notes tingling in right hand. No fevers, history of diabetes, IV drug use. REVIEW OF SYSTEMS    Constitutional:  Denies fever, chills  Musculoskeletal:  Denies back pain.  + R shoulder pain  Skin:  Denies rash  Neurologic:  Denies focal weakness    See HPI and nursing notes for additional information     I have reviewed the following nursing documentation:  Allergies: Allergies   Allergen Reactions    Bupropion Anxiety and Other (See Comments)       Past medical history:  has a past medical history of Endometriosis, Endometriosis of pelvis (), Goiter (), Grave's disease (), Pelvic inflammatory disease (PID) (), and Thyroid disease (hypothyroid). Past surgical history:  has a past surgical history that includes  section; Endometrial ablation; Endometrial ablation (); and Tubal ligation. Home medications:   Prior to Admission medications    Medication Sig Start Date End Date Taking?  Authorizing Provider   liothyronine (CYTOMEL) 5 MCG tablet Take 1 tablet by mouth daily 21   Claudio Mcgee MD   levothyroxine (SYNTHROID) 137 MCG tablet Take 1 tablet by mouth Daily Needs the synthroid 6/5/21 9/3/21  Claudio Mcgee MD   pravastatin (PRAVACHOL) 20 MG tablet Take 1 tablet by mouth daily 6/5/21   Duglas Silveira MD   omeprazole (PRILOSEC) 40 MG delayed release capsule TAKE 1 CAPSULE BY MOUTH DAILY 5/3/21 8/1/21  Duglas Silveira MD   Vitamins-Lipotropics (MULTI-VITAMIN HP/MINERALS PO) Take 1 tablet by mouth daily    Historical Provider, MD       Social history:  reports that she quit smoking about 4 years ago. Her smoking use included cigarettes. She has a 4.50 pack-year smoking history. She has never used smokeless tobacco. She reports current alcohol use of about 5.0 standard drinks of alcohol per week. She reports that she does not use drugs. Family history:    Family History   Problem Relation Age of Onset    High Blood Pressure Mother     Substance Abuse Mother     Depression Mother     Substance Abuse Father          Exam  BP (!) 131/95   Pulse 65   Temp 98.1 °F (36.7 °C) (Oral)   Resp 18   Ht 5' 2\" (1.575 m)   Wt 213 lb (96.6 kg)   SpO2 96%   BMI 38.96 kg/m²   Nursing note and vitals reviewed. Constitutional: Well developed, well nourished. No acute distress. HENT:      Head: Normocephalic and atraumatic. Ears: External ears normal.      Nose: Nose normal.  Cardiovascular: Radial pulses 2+ bilaterally. Pulmonary/Chest: Effort normal. No respiratory distress. Musculoskeletal: Moves all extremities. There is tenderness to palpation over the right anterior shoulder joint and acromioclavicular joint. No tenderness over the clavicle, right scapula, rotator cuff musculature. There is tenderness over the bicep tendon and the biceps muscle. Range of motion of shoulder is limited secondary to pain in all directions. Range of motion of hand, wrist, forearm intact, flexion of elbow also worsens pain. Neurological: Alert and oriented to person, place, and time. Motor and sensation grossly intact. Normal muscle tone.  5 out of 5 bilaterally. Skin: Warm and dry. No rash. Good capillary refill noted in right fingers. Psychiatric: Normal mood and affect. Behavior is normal.    Procedures        Radiographs (if obtained):  [] The following radiograph was interpreted by myself in the absence of a radiologist:   [x] Radiologist's Report Reviewed:  No orders to display          Labs  No results found for this visit on 09/26/21. MDM  Patient presents for right shoulder pain. No specific trauma. No history of osteoporosis. Appears to be musculoskeletal on exam, quite exacerbated by range of motion of shoulder and flexion of the elbow. There is tenderness throughout the bicep muscle, bicep tendon, anterior shoulder joint. Discussed with patient that it is unlikely to be fractured given her lack of osteoporosis, general health, lack of trauma. Offered x-ray if patient would still prefer this to assuage concern, she now declines. Plan is to start on sling, recommended NSAIDs and Tylenol, she has an orthopedist that she follows with, see them in 1 week if not improving. Differential includes muscular strain of the bicep, bicep tendinitis, rotator cuff injury, osteoarthritis flare. Return to ED for any new or worsening symptoms. I have independently evaluated this patient. Final Impression  1. Acute pain of right shoulder        Blood pressure (!) 131/95, pulse 65, temperature 98.1 °F (36.7 °C), temperature source Oral, resp. rate 18, height 5' 2\" (1.575 m), weight 213 lb (96.6 kg), SpO2 96 %, not currently breastfeeding. Disposition:  Discharge to home in stable condition. Patient was given scripts for the following medications. I counseled patient how to take these medications. New Prescriptions    No medications on file       [unfilled]    This chart was generated using the [address] [place] [address] [employer] system. I created this record but it may contain dictation errors given the limitations of this technology.        [address] [place], [place]-  09/26/21 2375

## 2021-12-06 ENCOUNTER — HOSPITAL ENCOUNTER (OUTPATIENT)
Dept: MAMMOGRAPHY | Age: 46
Discharge: HOME | End: 2021-12-06
Payer: COMMERCIAL

## 2021-12-06 DIAGNOSIS — Z12.31 SCREENING MAMMOGRAM, ENCOUNTER FOR: ICD-10-CM

## 2021-12-06 PROCEDURE — 77063 BREAST TOMOSYNTHESIS BI: CPT

## 2021-12-10 ENCOUNTER — HOSPITAL ENCOUNTER (OUTPATIENT)
Dept: ULTRASOUND IMAGING | Age: 46
Discharge: HOME | End: 2021-12-10
Payer: COMMERCIAL

## 2021-12-10 DIAGNOSIS — R92.8 ABNORMAL FINDING ON BREAST IMAGING: ICD-10-CM

## 2021-12-10 PROCEDURE — 76642 ULTRASOUND BREAST LIMITED: CPT

## 2021-12-27 ENCOUNTER — OFFICE VISIT (OUTPATIENT)
Dept: FAMILY MEDICINE CLINIC | Age: 46
End: 2021-12-27
Payer: COMMERCIAL

## 2021-12-27 VITALS
OXYGEN SATURATION: 98 % | SYSTOLIC BLOOD PRESSURE: 120 MMHG | DIASTOLIC BLOOD PRESSURE: 82 MMHG | WEIGHT: 229.4 LBS | BODY MASS INDEX: 41.96 KG/M2 | HEART RATE: 101 BPM

## 2021-12-27 DIAGNOSIS — K21.9 GASTROESOPHAGEAL REFLUX DISEASE WITHOUT ESOPHAGITIS: ICD-10-CM

## 2021-12-27 DIAGNOSIS — E03.9 ACQUIRED HYPOTHYROIDISM: Primary | ICD-10-CM

## 2021-12-27 DIAGNOSIS — E78.2 MIXED HYPERLIPIDEMIA: ICD-10-CM

## 2021-12-27 PROCEDURE — 99214 OFFICE O/P EST MOD 30 MIN: CPT | Performed by: FAMILY MEDICINE

## 2021-12-27 RX ORDER — OMEPRAZOLE 40 MG/1
40 CAPSULE, DELAYED RELEASE ORAL DAILY
Qty: 30 CAPSULE | Refills: 5 | Status: SHIPPED | OUTPATIENT
Start: 2021-12-27 | End: 2022-03-27

## 2021-12-27 RX ORDER — PRAVASTATIN SODIUM 20 MG
20 TABLET ORAL DAILY
Qty: 30 TABLET | Refills: 5 | Status: SHIPPED | OUTPATIENT
Start: 2021-12-27

## 2021-12-27 RX ORDER — LIOTHYRONINE SODIUM 5 UG/1
5 TABLET ORAL DAILY
Qty: 30 TABLET | Refills: 5 | Status: SHIPPED | OUTPATIENT
Start: 2021-12-27 | End: 2022-02-14

## 2021-12-27 RX ORDER — LEVOTHYROXINE SODIUM 137 UG/1
137 TABLET ORAL DAILY
Qty: 30 TABLET | Refills: 5 | Status: SHIPPED | OUTPATIENT
Start: 2021-12-27 | End: 2022-02-14

## 2021-12-27 ASSESSMENT — ENCOUNTER SYMPTOMS
COUGH: 0
SHORTNESS OF BREATH: 0

## 2021-12-27 NOTE — PROGRESS NOTES
Nayla Jeffery  20    Chief Complaint   Patient presents with    6 Month Follow-Up    Hypothyroidism    Hyperlipidemia    Anemia    Gastroesophageal Reflux           Patient here for 6 months f/u regarding GERD, HLD, hypothyroidism, and anemia, doing fine and she is taking her thyroid medication every day, her TSH is still not under control, her GERD under control too. Patient  taking her cholesterol medication, with no side effects. Past Medical History:   Diagnosis Date    Endometriosis     Endometriosis of pelvis     Goiter     Grave's disease     Pelvic inflammatory disease (PID)     Thyroid disease hypothyroid     Past Surgical History:   Procedure Laterality Date     SECTION      ENDOMETRIAL ABLATION      ENDOMETRIAL ABLATION      TUBAL LIGATION       Family History   Problem Relation Age of Onset    High Blood Pressure Mother     Substance Abuse Mother     Depression Mother     Substance Abuse Father      Social History     Socioeconomic History    Marital status:      Spouse name: Not on file    Number of children: Not on file    Years of education: Associates    Highest education level: Not on file   Occupational History    Occupation: RN   Tobacco Use    Smoking status: Former Smoker     Packs/day: 0.25     Years: 18.00     Pack years: 4.50     Types: Cigarettes     Quit date:      Years since quittin.9    Smokeless tobacco: Never Used   Substance and Sexual Activity    Alcohol use:  Yes     Alcohol/week: 5.0 standard drinks     Types: 5 Glasses of wine per week     Comment: 3 x a week    Drug use: No    Sexual activity: Yes     Partners: Male   Other Topics Concern    Not on file   Social History Narrative    Not on file     Social Determinants of Health     Financial Resource Strain:     Difficulty of Paying Living Expenses: Not on file   Food Insecurity:     Worried About Running Out of Food in the Last Year: Not on file    Ran Out of Food in the Last Year: Not on file   Transportation Needs:     Lack of Transportation (Medical): Not on file    Lack of Transportation (Non-Medical): Not on file   Physical Activity:     Days of Exercise per Week: Not on file    Minutes of Exercise per Session: Not on file   Stress:     Feeling of Stress : Not on file   Social Connections:     Frequency of Communication with Friends and Family: Not on file    Frequency of Social Gatherings with Friends and Family: Not on file    Attends [employer] Services: Not on file    Active Member of [address] [place] [address] [employer] or Organizations: Not on file    Attends Club or Organization Meetings: Not on file    Marital Status: Not on file   Intimate Partner Violence:     Fear of Current or Ex-Partner: Not on file    Emotionally Abused: Not on file    Physically Abused: Not on file    Sexually Abused: Not on file   Housing Stability:     Unable to Pay for Housing in the Last Year: Not on file    Number of [place] in the Last Year: Not on file    Unstable Housing in the Last Year: Not on file       Allergies   Allergen Reactions    Bupropion Anxiety and Other (See Comments)     Current Outpatient Medications   Medication Sig Dispense Refill    pravastatin (PRAVACHOL) 20 MG tablet Take 1 tablet by mouth daily 30 tablet 5    liothyronine (CYTOMEL) 5 MCG tablet Take 1 tablet by mouth daily 30 tablet 5    levothyroxine (SYNTHROID) 137 MCG tablet Take 1 tablet by mouth Daily Needs the synthroid 30 tablet 5    omeprazole (PRILOSEC) 40 MG delayed release capsule Take 1 capsule by mouth Daily 30 capsule 5    Vitamins-Lipotropics (MULTI-VITAMIN HP/MINERALS PO) Take 1 tablet by mouth daily       No current facility-administered medications for this visit. Review of Systems   Constitutional: Negative for activity change, chills and fever. HENT: Negative for congestion. Respiratory: Negative for cough and shortness of breath. Cardiovascular: Negative for chest pain and leg swelling. Genitourinary: Negative for dysuria and frequency. Neurological: Negative for dizziness and headaches. Psychiatric/Behavioral: Negative for agitation and sleep disturbance. The patient is not nervous/anxious.         Lab Results   Component Value Date    WBC 9.4 08/10/2021    HGB 11.5 (L) 08/10/2021    HCT 38.4 08/10/2021    MCV 79.3 08/10/2021     08/10/2021     Lab Results   Component Value Date     (L) 08/10/2021    K 3.6 08/10/2021    CL 96 (L) 08/10/2021    CO2 28 08/10/2021    BUN 15 08/10/2021    CREATININE 1.0 08/10/2021    GLUCOSE 92 08/10/2021    CALCIUM 9.0 08/10/2021    PROT 7.9 02/20/2020    LABALBU 4.4 02/20/2020    BILITOT 0.2 02/20/2020    ALKPHOS 85 02/20/2020    AST 23 02/20/2020    ALT 17 02/20/2020    LABGLOM 60 (L) 08/10/2021    GFRAA >60 08/10/2021    AGRATIO 1.4 04/18/2018    GLOB 3.2 04/18/2018     Lab Results   Component Value Date    CHOL 257 (H) 06/04/2021    CHOL 261 (H) 02/04/2021    CHOL 232 (H) 02/20/2020     Lab Results   Component Value Date    TRIG 288 (H) 06/04/2021    TRIG 332 (H) 02/04/2021    TRIG 293 (H) 02/20/2020     Lab Results   Component Value Date    HDL 63 (H) 06/04/2021    HDL 51 02/04/2021    HDL 56 02/20/2020     Lab Results   Component Value Date    LDLCALC 136 (H) 06/04/2021    LDLCALC 96 08/09/2018     No results found for: LABA1C  Lab Results   Component Value Date    TSH 7.23 (H) 06/04/2021    TSHHS 6.280 (H) 02/04/2021         /82 (Site: Left Upper Arm, Position: Sitting, Cuff Size: Large Adult)   Pulse 101   Wt 229 lb 6.4 oz (104.1 kg)   SpO2 98%   BMI 41.96 kg/m²     BP Readings from Last 3 Encounters:   12/27/21 120/82   09/26/21 (!) 131/95   08/10/21 (!) 149/97       Wt Readings from Last 3 Encounters:   12/27/21 229 lb 6.4 oz (104.1 kg)   09/26/21 213 lb (96.6 kg)   08/27/21 232 lb (105.2 kg)         Physical Exam  Constitutional:       General: She is not in acute distress. Appearance: Normal appearance. She is well-developed. She is obese. She is not ill-appearing or diaphoretic. HENT:      Head: Normocephalic and atraumatic. Eyes:      General: No scleral icterus. Extraocular Movements: Extraocular movements intact. Pupils: Pupils are equal, round, and reactive to light. Cardiovascular:      Rate and Rhythm: Normal rate and regular rhythm. Heart sounds: No murmur heard. Pulmonary:      Effort: Pulmonary effort is normal.      Breath sounds: Normal breath sounds. No wheezing or rales. Musculoskeletal:         General: Normal range of motion. Cervical back: Normal range of motion and neck supple. No rigidity. No muscular tenderness. Right lower leg: No edema. Left lower leg: No edema. Neurological:      General: No focal deficit present. Mental Status: She is alert and oriented to person, place, and time. Psychiatric:         Mood and Affect: Mood normal.         Behavior: Behavior normal.         ASSESSMENT/ PLAN:    1. Acquired hypothyroidism  - patient feeling better  - TSH without Reflex; Future  - T4, Free; Future  - liothyronine (CYTOMEL) 5 MCG tablet; Take 1 tablet by mouth daily  Dispense: 30 tablet; Refill: 5  - levothyroxine (SYNTHROID) 137 MCG tablet; Take 1 tablet by mouth Daily Needs the synthroid  Dispense: 30 tablet; Refill: 5    2. Mixed hyperlipidemia  - recheck:  - Lipid Panel; Future  - pravastatin (PRAVACHOL) 20 MG tablet; Take 1 tablet by mouth daily  Dispense: 30 tablet; Refill: 5    3. Gastroesophageal reflux disease without esophagitis  - stable  - omeprazole (PRILOSEC) 40 MG delayed release capsule; Take 1 capsule by mouth Daily  Dispense: 30 capsule; Refill: 11              - All old blood work reviewed with the patient  - Appropriate prescription are addressed. - After visit summery provided.   - Questions answered and patient verbalizes understanding.  - Call for any problem, questions, or concerns. Return in about 6 months (around 6/27/2022).

## 2022-02-12 DIAGNOSIS — E03.9 ACQUIRED HYPOTHYROIDISM: ICD-10-CM

## 2022-02-14 RX ORDER — LEVOTHYROXINE SODIUM 137 MCG
TABLET ORAL
Qty: 30 TABLET | Refills: 5 | Status: SHIPPED | OUTPATIENT
Start: 2022-02-14

## 2022-02-14 RX ORDER — LIOTHYRONINE SODIUM 5 UG/1
5 TABLET ORAL DAILY
Qty: 30 TABLET | Refills: 5 | Status: SHIPPED | OUTPATIENT
Start: 2022-02-14

## 2023-01-27 ENCOUNTER — TELEPHONE (OUTPATIENT)
Dept: FAMILY MEDICINE CLINIC | Age: 48
End: 2023-01-27

## 2023-01-27 NOTE — TELEPHONE ENCOUNTER
----- Message from Dahiana pSence sent at 1/27/2023  1:22 PM EST -----  Subject: Referral Request    Reason for referral request? Patient is requesting a thyroid panel  Provider patient wants to be referred to(if known):     Provider Phone Number(if known):    Additional Information for Provider? Patient has an upcoming physical   2/21/2023  ---------------------------------------------------------------------------  --------------  CALL BACK INFO    [phone]; OK to leave message on voicemail  ---------------------------------------------------------------------------  --------------

## 2023-02-14 ENCOUNTER — OFFICE VISIT (OUTPATIENT)
Dept: ORTHOPEDIC SURGERY | Age: 48
End: 2023-02-14

## 2023-02-14 ENCOUNTER — TELEPHONE (OUTPATIENT)
Dept: ORTHOPEDIC SURGERY | Age: 48
End: 2023-02-14

## 2023-02-14 VITALS
WEIGHT: 232 LBS | DIASTOLIC BLOOD PRESSURE: 62 MMHG | SYSTOLIC BLOOD PRESSURE: 122 MMHG | HEIGHT: 62 IN | BODY MASS INDEX: 42.69 KG/M2

## 2023-02-14 DIAGNOSIS — M17.12 PRIMARY OSTEOARTHRITIS OF LEFT KNEE: ICD-10-CM

## 2023-02-14 DIAGNOSIS — M17.11 PRIMARY OSTEOARTHRITIS OF RIGHT KNEE: Primary | ICD-10-CM

## 2023-02-14 RX ORDER — LIOTHYRONINE SODIUM 5 UG/1
TABLET ORAL
COMMUNITY
End: 2023-02-14

## 2023-02-14 NOTE — PATIENT INSTRUCTIONS
Continue to weight bear as tolerated  Continue range of motion  Ice and elevate as needed  Tylenol or Motrin for pain  Injection given today in the office   Rest for 24-48 hours  Follow up in 6 weeks    We are committed to providing you the best care possible. If you receive a survey after visiting one of our offices, please take time to share your experience concerning your physician office visit. These surveys are confidential and no health information about you is shared. We are eager to improve for you and we are counting on your feedback to help make that happen.

## 2023-02-14 NOTE — PROGRESS NOTES
Patient returns to the office today for FU of the bilateral knee pain. Pt has had gel injections last injection 4/19/21. R>L Pt states the gel injections did help with her pain until about two months ago. Pt would florence more gel injections. Pt states he knees are starting to buckle on her.  Pt states she is not allergic to eggs

## 2023-02-25 ASSESSMENT — ENCOUNTER SYMPTOMS
SHORTNESS OF BREATH: 0
CHEST TIGHTNESS: 0
COLOR CHANGE: 0

## 2023-02-25 NOTE — PROGRESS NOTES
2/14/2023   Chief Complaint   Patient presents with    Knee Pain     Bilateral, last injections 8/27/2023        History of Present Illness:                             Natalie Garrison is a 49 y.o. female who returns today for evaluation for bilateral worsening right and left knee pain. Right is more painful than the left. Pain is worse with weightbearing and prolonged standing walking activities. She feels that the gel injections helped but she is having more swelling and inflammation that has worsened over the last few months. She is interested in having steroid injections today pain is worse with going up and down stairs and is most significant along the medial joint line    Patient returns to the office today for FU of the bilateral knee pain. Pt has had gel injections last injection 4/19/21. R>L Pt states the gel injections did help with her pain until about two months ago. Pt would florence more gel injections. Pt states he knees are starting to buckle on her. Medical History  Patient's medications, allergies, past medical, surgical, social and family histories were reviewed and updated as appropriate. Review of Systems   Constitutional:  Negative for activity change and fever. Respiratory:  Negative for chest tightness and shortness of breath. Cardiovascular:  Negative for chest pain. Skin:  Negative for color change. Neurological:  Negative for dizziness. Psychiatric/Behavioral:  The patient is not nervous/anxious. Examination:  General Exam:  Vitals: /62 (Site: Right Upper Arm, Position: Sitting, Cuff Size: Large Adult)   Ht 5' 2\" (1.575 m)   Wt 232 lb (105.2 kg)   BMI 42.43 kg/m²    Physical Exam  Vitals and nursing note reviewed. Constitutional:       Appearance: Normal appearance. HENT:      Head: Normocephalic and atraumatic.    Eyes:      Conjunctiva/sclera: Conjunctivae normal.      Pupils: Pupils are equal, round, and reactive to light. Pulmonary:      Effort: Pulmonary effort is normal.   Musculoskeletal:      Cervical back: Normal range of motion. Right hip: No deformity, tenderness, bony tenderness or crepitus. Normal range of motion. Normal strength. Left hip: Normal.      Left knee: No swelling, deformity, effusion, ecchymosis or lacerations. Normal range of motion. No tenderness. No medial joint line or lateral joint line tenderness. No LCL laxity or MCL laxity. Normal alignment and normal meniscus. Comments: Right Lower Extremity:    There is moderate to severe tenderness to palpation diffusely throughout the knee, most significant along the medial joint line. There is a moderate knee joint effusion present and mild global swelling anteriorly. Mild restricted range of motion at the knee with approximately 5 degrees lack of full extension and knee flexion up to 120 degrees with pain at the extremes of motion. There is mild crepitation during active range of motion. There is mild varus knee alignment. There is 5 out of 5 strength with knee flexion and extension. There is no instability to varus or valgus stress testing and anterior and posterior drawer testing. Sensation is intact to light touch throughout the lower extremity. There is positive medial Evelyn's test with tenderness to palpation and pain along the medial joint line. Skin is intact. Pulses are intact    No pain with active range of motion of the hip. Strength and range of motion of the hip are intact. No tenderness to palpation at the hip. Left Lower Extremity:  There is moderate tenderness to palpation throughout the knee most significant along the medial joint line. There is a mild effusion present and mild global swelling at the knee anteriorly. Mild restricted range of motion at the knee with approximately 3 degrees short of full extension and knee flexion up to 130 degrees with pain at the extremes of motion. There is mild crepitation at the knee during active range of motion. There is normal knee alignment. There is 5 out of 5 strength with knee flexion and extension. There is no instability to varus or valgus stress testing or anterior and posterior drawer testing. Sensation is intact to light touch throughout the lower extremity. There is a moderately positive Evelyn's test with tenderness to palpation and pain along the medial joint line. Skin is intact. Pulses intact    No pain with active range of motion of the hip. Strength and range of motion of the hip are intact. No tenderness to palpation at the hip. Skin:     General: Skin is warm and dry. Neurological:      Mental Status: She is alert and oriented to person, place, and time. Psychiatric:         Mood and Affect: Mood normal.         Behavior: Behavior normal.            Diagnostic testing:  X-rays reviewed in office, I independently reviewed the films in the office today:   4 views of the Right Knee: There is evidence of moderate to severe degenerative changes present in    all 3 compartments of the knee most significant along the medial    compartment where there is advanced joint space collapse and bone on bone    articulation with prominent osteophyte formation. There is subchondral    sclerosis present in medial compartment with cortical irregularities on    both sides of the joint and mild tibial erosion. There is a moderate    varus alignment present. There are prominent arthritic changes in the    patellofemoral joint with joint space narrowing and osteophyte formation. Normal bone density is present. Mild soft tissue swelling present at the    knee joint. No fractures. Chronic calcifications present at the inferior    aspect of the patellar tendon along the tibial tubercle       Impression:   Moderate to severe varus tricompartmental arthritis           4 views of the Left Knee:        There is evidence of moderate to severe degenerative changes present in    all 3 compartments of the knee most significant along the medial    compartment where there is advanced joint space collapse and bone on bone    articulation with prominent osteophyte formation. There is subchondral    sclerosis present in medial compartment with cortical irregularities on    both sides of the joint and mild tibial erosion. There is a moderate    varus alignment present. There are prominent arthritic changes in the    patellofemoral joint with joint space narrowing and osteophyte formation. Normal bone density is present. Mild soft tissue swelling present at the    knee joint. No fractures or loose bodies identified. Impression:   Moderate to severe varus tricompartmental arthritis             Office Procedures:  Orders Placed This Encounter   Procedures    MD ARTHROCENTESIS ASPIR&/INJ MAJOR JT/BURSA W/O US    MD ARTHROCENTESIS ASPIR&/INJ MAJOR JT/BURSA W/O US       Assessment and Plan  1. Bilateral right worse than left moderate to severe primary osteoarthritis    I discussed the degenerative findings of the right knee on x-ray and exam with the patient. I have recommended maximizing conservative treatments for the arthritic knee condition. We discussed the use of steroid injections as needed for severe symptoms. Currently patient is having significant pain on a daily basis and this is impacting activities of daily living and ability to get comfortable at rest.  The patient would like to have an injection performed today. Procedure Note, Right Knee Intraarticular Injection:  The right knee was prepped with alcohol and injected intra-articularly with 40 mg of Kenalog and 4 mL of 1% lidocaine through a 22-gauge needle. Sterile Band-Aid was applied. The patient tolerated it well without complications. Procedure Note, Left Knee Intra-articular Injection:    The left knee was prepped with alcohol.  A 22 gauge needle was inserted into the knee joint. The knee was then injected with 40 mg of Kenalog and 4 mL of 1% plain lidocaine. A sterile Band-Aid was applied. The patient tolerated the procedure well with no complications. I have recommended weight loss and maintaining a healthy weight to decrease the forces across the degenerative knee joint. We discussed the importance of maintaining flexibility and strength at the knee. I have advised the patient to have a home exercise program that includes stretching and low impact activities such as walking, biking, or elliptical machines. We discussed the possibility of physical therapy. The patient would like to defer formal physical therapy at this time. They will call if they would like to have a referral sent. I instructed the patient on the use of over-the-counter anti-inflammatory medications. Follow-up in 6 weeks for check of the response to the injection and home exercise program.    We discussed possible viscosupplementation injections if she does not respond appropriately to the steroid injections.             Electronically signed by Abundio Garcia MD on 2/25/2023 at 10:57 AM

## 2023-02-27 NOTE — TELEPHONE ENCOUNTER
I called [facility] at [phone] and spoke with rTavis Altman who assisted me is obtaining a precert for Euflexxa B/L knee. Buy and bill in office procedure. M17.11  M17.12  [place] [facility] [place] [phone]  Euflexxa  (preferred)  Approved 2/27/23-8/27/23  Auth # [id number]    Medication in stock an labeled.

## 2023-02-28 ENCOUNTER — HOSPITAL ENCOUNTER (OUTPATIENT)
Age: 48
Discharge: HOME | End: 2023-02-28
Payer: COMMERCIAL

## 2023-02-28 DIAGNOSIS — D64.9 ANEMIA, UNSPECIFIED TYPE: ICD-10-CM

## 2023-02-28 DIAGNOSIS — R73.9 HYPERGLYCEMIA: ICD-10-CM

## 2023-02-28 DIAGNOSIS — E03.9 ACQUIRED HYPOTHYROIDISM: ICD-10-CM

## 2023-02-28 DIAGNOSIS — E78.2 MIXED HYPERLIPIDEMIA: ICD-10-CM

## 2023-02-28 LAB
ALBUMIN SERPL-MCNC: 4.3 GM/DL (ref 3.4–5)
ALP BLD-CCNC: 77 IU/L (ref 40–128)
ALT SERPL-CCNC: 21 U/L (ref 10–40)
ANION GAP SERPL CALCULATED.3IONS-SCNC: 12 MMOL/L (ref 4–16)
AST SERPL-CCNC: 24 IU/L (ref 15–37)
BASOPHILS ABSOLUTE: 0 K/CU MM
BASOPHILS RELATIVE PERCENT: 0.4 % (ref 0–1)
BILIRUB SERPL-MCNC: 0.6 MG/DL (ref 0–1)
BUN SERPL-MCNC: 16 MG/DL (ref 6–23)
CALCIUM SERPL-MCNC: 9 MG/DL (ref 8.3–10.6)
CHLORIDE BLD-SCNC: 99 MMOL/L (ref 99–110)
CHOLEST SERPL-MCNC: 324 MG/DL
CO2: 28 MMOL/L (ref 21–32)
CREAT SERPL-MCNC: 1.2 MG/DL (ref 0.6–1.1)
DIFFERENTIAL TYPE: ABNORMAL
EOSINOPHILS ABSOLUTE: 0.1 K/CU MM
EOSINOPHILS RELATIVE PERCENT: 0.8 % (ref 0–3)
ESTIMATED AVERAGE GLUCOSE: 111 MG/DL
GFR SERPL CREATININE-BSD FRML MDRD: 56 ML/MIN/1.73M2
GLUCOSE P FAST SERPL-MCNC: 94 MG/DL (ref 70–99)
HBA1C MFR BLD: 5.5 % (ref 4.2–6.3)
HCT VFR BLD CALC: 38.3 % (ref 37–47)
HDLC SERPL-MCNC: 79 MG/DL
HEMOGLOBIN: 11.4 GM/DL (ref 12.5–16)
IMMATURE NEUTROPHIL %: 0.3 % (ref 0–0.43)
LDLC SERPL CALC-MCNC: 215 MG/DL
LYMPHOCYTES ABSOLUTE: 2.1 K/CU MM
LYMPHOCYTES RELATIVE PERCENT: 27.7 % (ref 24–44)
MCH RBC QN AUTO: 24.3 PG (ref 27–31)
MCHC RBC AUTO-ENTMCNC: 29.8 % (ref 32–36)
MCV RBC AUTO: 81.5 FL (ref 78–100)
MONOCYTES ABSOLUTE: 0.5 K/CU MM
MONOCYTES RELATIVE PERCENT: 7.1 % (ref 0–4)
NUCLEATED RBC %: 0 %
PDW BLD-RTO: 15.4 % (ref 11.7–14.9)
PLATELET # BLD: 251 K/CU MM (ref 140–440)
PMV BLD AUTO: 11.4 FL (ref 7.5–11.1)
POTASSIUM SERPL-SCNC: 4.3 MMOL/L (ref 3.5–5.1)
RBC # BLD: 4.7 M/CU MM (ref 4.2–5.4)
SEGMENTED NEUTROPHILS ABSOLUTE COUNT: 4.7 K/CU MM
SEGMENTED NEUTROPHILS RELATIVE PERCENT: 63.7 % (ref 36–66)
SODIUM BLD-SCNC: 139 MMOL/L (ref 135–145)
T4 FREE SERPL-MCNC: 0.36 NG/DL (ref 0.9–1.8)
TOTAL IMMATURE NEUTOROPHIL: 0.02 K/CU MM
TOTAL NUCLEATED RBC: 0 K/CU MM
TOTAL PROTEIN: 7.1 GM/DL (ref 6.4–8.2)
TRIGL SERPL-MCNC: 152 MG/DL
TSH SERPL DL<=0.005 MIU/L-ACNC: 32.3 UIU/ML (ref 0.27–4.2)
WBC # BLD: 7.4 K/CU MM (ref 4–10.5)

## 2023-02-28 PROCEDURE — 85025 COMPLETE CBC W/AUTO DIFF WBC: CPT

## 2023-02-28 PROCEDURE — 83036 HEMOGLOBIN GLYCOSYLATED A1C: CPT

## 2023-02-28 PROCEDURE — 84443 ASSAY THYROID STIM HORMONE: CPT

## 2023-02-28 PROCEDURE — 80053 COMPREHEN METABOLIC PANEL: CPT

## 2023-02-28 PROCEDURE — 36415 COLL VENOUS BLD VENIPUNCTURE: CPT

## 2023-02-28 PROCEDURE — 80061 LIPID PANEL: CPT

## 2023-02-28 PROCEDURE — 84439 ASSAY OF FREE THYROXINE: CPT

## 2023-03-03 ENCOUNTER — OFFICE VISIT (OUTPATIENT)
Dept: FAMILY MEDICINE CLINIC | Age: 48
End: 2023-03-03
Payer: COMMERCIAL

## 2023-03-03 VITALS
DIASTOLIC BLOOD PRESSURE: 80 MMHG | WEIGHT: 237 LBS | SYSTOLIC BLOOD PRESSURE: 110 MMHG | BODY MASS INDEX: 43.35 KG/M2 | HEART RATE: 92 BPM | OXYGEN SATURATION: 98 %

## 2023-03-03 DIAGNOSIS — E78.2 MIXED HYPERLIPIDEMIA: ICD-10-CM

## 2023-03-03 DIAGNOSIS — R22.1 NECK SWELLING: ICD-10-CM

## 2023-03-03 DIAGNOSIS — F41.9 ANXIETY: ICD-10-CM

## 2023-03-03 DIAGNOSIS — E03.9 ACQUIRED HYPOTHYROIDISM: ICD-10-CM

## 2023-03-03 DIAGNOSIS — Z00.00 ANNUAL PHYSICAL EXAM: Primary | ICD-10-CM

## 2023-03-03 DIAGNOSIS — D50.9 IRON DEFICIENCY ANEMIA, UNSPECIFIED IRON DEFICIENCY ANEMIA TYPE: ICD-10-CM

## 2023-03-03 PROCEDURE — 99396 PREV VISIT EST AGE 40-64: CPT | Performed by: FAMILY MEDICINE

## 2023-03-03 PROCEDURE — G8484 FLU IMMUNIZE NO ADMIN: HCPCS | Performed by: FAMILY MEDICINE

## 2023-03-03 RX ORDER — EVOLOCUMAB 140 MG/ML
140 INJECTION, SOLUTION SUBCUTANEOUS
Qty: 2 ADJUSTABLE DOSE PRE-FILLED PEN SYRINGE | Refills: 5 | Status: SHIPPED | OUTPATIENT
Start: 2023-03-03

## 2023-03-03 RX ORDER — HYDROXYZINE PAMOATE 25 MG/1
25 CAPSULE ORAL 3 TIMES DAILY PRN
Qty: 60 CAPSULE | Refills: 3 | Status: SHIPPED | OUTPATIENT
Start: 2023-03-03 | End: 2023-03-13

## 2023-03-03 NOTE — PROGRESS NOTES
Debora   22    Chief Complaint   Patient presents with    Annual Exam           Patient here for annual exam and for f/u regarding GERD, HLD, hypothyroidism, and anemia, doing fine and she is taking her thyroid medication every day, her TSH is still not under control, her GERD under control too. Patient stopped taking her cholesterol medication, because of the muscle pain side effects. Patient also asking for medication for her anxiety as needsed. Past Medical History:   Diagnosis Date    Endometriosis     Endometriosis of pelvis     Goiter     Grave's disease     Pelvic inflammatory disease (PID)     Thyroid disease hypothyroid     Past Surgical History:   Procedure Laterality Date     SECTION      ENDOMETRIAL ABLATION      ENDOMETRIAL ABLATION      TUBAL LIGATION       Family History   Problem Relation Age of Onset    High Blood Pressure Mother     Substance Abuse Mother     Depression Mother     Substance Abuse Father      Social History     Socioeconomic History    Marital status:      Spouse name: Not on file    Number of children: Not on file    Years of education: Associates    Highest education level: Not on file   Occupational History    Occupation: RN   Tobacco Use    Smoking status: Former     Packs/day: 0.25     Years: 18.00     Pack years: 4.50     Types: Cigarettes     Quit date:      Years since quittin.1    Smokeless tobacco: Never   Substance and Sexual Activity    Alcohol use:  Yes     Alcohol/week: 5.0 standard drinks     Types: 5 Glasses of wine per week     Comment: 3 x a week    Drug use: No    Sexual activity: Yes     Partners: Male   Other Topics Concern    Not on file   Social History Narrative    Not on file     Social Determinants of Health     Financial Resource Strain: Not on file   Food Insecurity: Not on file   Transportation Needs: Not on file   Physical Activity: Not on file   Stress: Not on file   Social Connections: Not on file   Intimate Partner Violence: Not on file   Housing Stability: Not on file       Allergies   Allergen Reactions    Hydromorphone Hcl Anaphylaxis    Bupropion Anxiety and Other (See Comments)     Current Outpatient Medications   Medication Sig Dispense Refill    Evolocumab (REPATHA SURECLICK) 749 MG/ML SOAJ Inject 140 mg into the skin every 14 days 2 Adjustable Dose Pre-filled Pen Syringe 5    hydrOXYzine pamoate (VISTARIL) 25 MG capsule Take 1 capsule by mouth 3 times daily as needed for Anxiety 60 capsule 3    liothyronine (CYTOMEL) 5 MCG tablet TAKE 1 TABLET BY MOUTH DAILY 30 tablet 5    SYNTHROID 137 MCG tablet TAKE 1 TABLET BY MOUTH DAILY 30 tablet 5    Vitamins-Lipotropics (MULTI-VITAMIN HP/MINERALS PO) Take 1 tablet by mouth daily       No current facility-administered medications for this visit. Review of Systems   Constitutional:  Negative for activity change, chills and fever. HENT:  Negative for congestion. Respiratory:  Negative for cough and shortness of breath. Cardiovascular:  Negative for chest pain and leg swelling. Genitourinary:  Negative for dysuria and frequency. Neurological:  Negative for dizziness and headaches. Psychiatric/Behavioral:  Positive for agitation. Negative for sleep disturbance. The patient is not nervous/anxious.       Lab Results   Component Value Date    WBC 7.4 02/28/2023    HGB 11.4 (L) 02/28/2023    HCT 38.3 02/28/2023    MCV 81.5 02/28/2023     02/28/2023     Lab Results   Component Value Date     02/28/2023    K 4.3 02/28/2023    CL 99 02/28/2023    CO2 28 02/28/2023    BUN 16 02/28/2023    CREATININE 1.2 (H) 02/28/2023    GLUCOSE 92 08/10/2021    CALCIUM 9.0 02/28/2023    PROT 7.1 02/28/2023    LABALBU 4.3 02/28/2023    BILITOT 0.6 02/28/2023    ALKPHOS 77 02/28/2023    AST 24 02/28/2023    ALT 21 02/28/2023    LABGLOM 56 (L) 02/28/2023    GFRAA >60 08/10/2021    AGRATIO 1.4 04/18/2018    GLOB 3.2 04/18/2018     Lab Results   Component Value Date    CHOL 324 (H) 02/28/2023    CHOL 257 (H) 06/04/2021    CHOL 261 (H) 02/04/2021     Lab Results   Component Value Date    TRIG 152 (H) 02/28/2023    TRIG 288 (H) 06/04/2021    TRIG 332 (H) 02/04/2021     Lab Results   Component Value Date    HDL 79 02/28/2023    HDL 63 (H) 06/04/2021    HDL 51 02/04/2021     Lab Results   Component Value Date    LDLCALC 215 (H) 02/28/2023    LDLCALC 136 (H) 06/04/2021    LDLCALC 96 08/09/2018     Lab Results   Component Value Date    LABA1C 5.5 02/28/2023     Lab Results   Component Value Date    TSH 7.23 (H) 06/04/2021    TSHHS 32.300 (H) 02/28/2023         /80 (Site: Left Upper Arm, Position: Sitting, Cuff Size: Large Adult)   Pulse 92   Wt 237 lb (107.5 kg)   SpO2 98%   BMI 43.35 kg/m²     BP Readings from Last 3 Encounters:   03/03/23 110/80   02/14/23 122/62   12/27/21 120/82       Wt Readings from Last 3 Encounters:   03/03/23 237 lb (107.5 kg)   02/14/23 232 lb (105.2 kg)   12/27/21 229 lb 6.4 oz (104.1 kg)         Physical Exam  Constitutional:       General: She is not in acute distress. Appearance: Normal appearance. She is well-developed. She is obese. She is not ill-appearing or diaphoretic. HENT:      Head: Normocephalic and atraumatic. Eyes:      General: No scleral icterus. Extraocular Movements: Extraocular movements intact. Pupils: Pupils are equal, round, and reactive to light. Neck:      Comments: Soft swelling of the thyroid  Cardiovascular:      Rate and Rhythm: Normal rate and regular rhythm. Heart sounds: No murmur heard. Pulmonary:      Effort: Pulmonary effort is normal.      Breath sounds: Normal breath sounds. No wheezing. Musculoskeletal:         General: Normal range of motion. Cervical back: Normal range of motion and neck supple. No rigidity. No muscular tenderness. Right lower leg: No edema. Left lower leg: No edema.    Neurological:      General: No focal deficit present. Mental Status: She is alert and oriented to person, place, and time. Psychiatric:         Mood and Affect: Mood normal.         Behavior: Behavior normal.       ASSESSMENT/ PLAN:    1. Annual physical exam  - doing fine    2. Acquired hypothyroidism  - TSH high 32.3 on 2/28/23 so will recheck in 4 wks, patient was not taking her levothyroxin and will do us neck  - T4, Free; Future  - TSH; Future    3. Mixed hyperlipidemia  - high last  on 2/28/23 could not tolerate the statin so will try:  - Evolocumab (REPATHA SURECLICK) 275 MG/ML SOAJ; Inject 140 mg into the skin every 14 days  Dispense: 2 Adjustable Dose Pre-filled Pen Syringe; Refill: 5  - Comprehensive Metabolic Panel, Fasting; Future    4. Iron deficiency anemia, unspecified iron deficiency anemia type  - f/u with the hematology    5. Neck swelling  - US HEAD NECK SOFT TISSUE THYROID; Future    6. Anxiety  - will try:  - hydrOXYzine pamoate (VISTARIL) 25 MG capsule; Take 1 capsule by mouth 3 times daily as needed for Anxiety  Dispense: 60 capsule; Refill: 3            - All old blood work reviewed with the patient  - Appropriate prescription are addressed. - After visit summery provided. - Questions answered and patient verbalizes understanding.  - Call for any problem, questions, or concerns.  - RTC if symptoms worse. Return in about 3 months (around 6/3/2023).

## 2023-03-05 PROBLEM — F41.9 ANXIETY: Status: ACTIVE | Noted: 2023-03-05

## 2023-03-05 PROBLEM — R22.1 NECK SWELLING: Status: ACTIVE | Noted: 2023-03-05

## 2023-03-05 ASSESSMENT — ENCOUNTER SYMPTOMS
SHORTNESS OF BREATH: 0
COUGH: 0

## 2023-03-08 ENCOUNTER — OFFICE VISIT (OUTPATIENT)
Dept: ORTHOPEDIC SURGERY | Age: 48
End: 2023-03-08
Payer: COMMERCIAL

## 2023-03-08 VITALS
HEIGHT: 62 IN | RESPIRATION RATE: 16 BRPM | WEIGHT: 237.8 LBS | HEART RATE: 90 BPM | OXYGEN SATURATION: 97 % | BODY MASS INDEX: 43.76 KG/M2

## 2023-03-08 DIAGNOSIS — M17.0 PRIMARY OSTEOARTHRITIS OF BOTH KNEES: Primary | ICD-10-CM

## 2023-03-08 DIAGNOSIS — E78.2 MIXED HYPERLIPIDEMIA: ICD-10-CM

## 2023-03-08 DIAGNOSIS — E03.9 ACQUIRED HYPOTHYROIDISM: ICD-10-CM

## 2023-03-08 PROCEDURE — 20610 DRAIN/INJ JOINT/BURSA W/O US: CPT | Performed by: PHYSICIAN ASSISTANT

## 2023-03-08 PROCEDURE — 99999 PR OFFICE/OUTPT VISIT,PROCEDURE ONLY: CPT | Performed by: PHYSICIAN ASSISTANT

## 2023-03-08 NOTE — PATIENT INSTRUCTIONS
Euflexxa # 1  Rest both knees for 24-48 hours  Work on ROM and strengthening of knees and legs   May take NSAIDS or anti-inflammatories as needed  Weightbearing as tolerated  Follow up in one week for the 2nd injection    We are committed to providing you the best care possible. If you receive a survey after visiting one of our offices, please take time to share your experience concerning your physician office visit. These surveys are confidential and no health information about you is shared. We are eager to improve for you and we are counting on your feedback to help make that happen.

## 2023-03-08 NOTE — PROGRESS NOTES
VISCO-SUPPLEMENTATION INJECTION (Number 1)    HISTORY OF PRESENT ILLNESS (HPI):   Meli Quinteros is a 49y.o. year old female who is here for follow up for visco-supplementation injection number 1 for   1. Primary osteoarthritis of both knees    . PAST HISTORY:   Unless otherwise specified in this note, the past history is reviewed today with the patient and is as follows-    Allergies   Allergen Reactions    Hydromorphone Hcl Anaphylaxis    Bupropion Anxiety and Other (See Comments)       Current Outpatient Medications   Medication Sig Dispense Refill    Evolocumab (REPATHA SURECLICK) 568 MG/ML SOAJ Inject 140 mg into the skin every 14 days 2 Adjustable Dose Pre-filled Pen Syringe 5    hydrOXYzine pamoate (VISTARIL) 25 MG capsule Take 1 capsule by mouth 3 times daily as needed for Anxiety 60 capsule 3    liothyronine (CYTOMEL) 5 MCG tablet TAKE 1 TABLET BY MOUTH DAILY 30 tablet 5    SYNTHROID 137 MCG tablet TAKE 1 TABLET BY MOUTH DAILY 30 tablet 5    Vitamins-Lipotropics (MULTI-VITAMIN HP/MINERALS PO) Take 1 tablet by mouth daily       No current facility-administered medications for this visit. PHYSICAL EXAM:   There were no vitals taken for this visit. The right knee is examined:  Inspection:  Skin is intact. No erythema. Palpation:  No swelling or acute tenderness. Neuro/Vascular/Motor:  Sensation to light touch intact. Capillary refill brisk. No focal motor deficits. The left knee is examined:  Inspection:  Skin is intact. No erythema. Palpation:  No swelling or acute tenderness. Neuro/Vascular/Motor:  Sensation to light touch intact. Capillary refill brisk. No focal motor deficits. DIAGNOSIS:     1. Primary osteoarthritis of both knees        PROCEDURE:   Right Knee Aspiration / Injection Procedure:  Multiple treatment options were discussed. Joint injection was recommended. Details of the procedure, potential risks, and potential benefits were discussed.   Patient's questions were answered. Patient elected to proceed with procedure. Medication: Euflexxa  NDC #: [id number]  Lot #: [id number]  Procedure: Sterile technique was used as the skin over the injection site was prepped with alcohol then the knee joint was then injected through the inferior lateral joint with the above listed medication. A sterile bandage was placed over the injection site. The patient tolerated the procedure well without complication. The patient is scheduled for the second injection in one week. Left Knee Aspiration / Injection Procedure:  Multiple treatment options were discussed. Joint injection was recommended. Details of the procedure, potential risks, and potential benefits were discussed. Patient's questions were answered. Patient elected to proceed with procedure. Medication: Eulfexxa  [facility] #: [id number]  Lot #: [id number]  Procedure: Sterile technique was used as the skin over the injection site was prepped with alcohol then the knee joint was then injected through the inferior lateral joint with the above listed medication. A sterile bandage was placed over the injection site. The patient tolerated the procedure well without complication. The patient is scheduled for the second injection in one week.

## 2023-03-09 RX ORDER — LIOTHYRONINE SODIUM 5 UG/1
5 TABLET ORAL DAILY
Qty: 30 TABLET | Refills: 5 | Status: SHIPPED | OUTPATIENT
Start: 2023-03-09

## 2023-03-09 RX ORDER — LEVOTHYROXINE SODIUM 137 MCG
137 TABLET ORAL DAILY
Qty: 30 TABLET | Refills: 5 | Status: SHIPPED | OUTPATIENT
Start: 2023-03-09

## 2023-03-09 RX ORDER — EVOLOCUMAB 140 MG/ML
140 INJECTION, SOLUTION SUBCUTANEOUS
Qty: 2 ADJUSTABLE DOSE PRE-FILLED PEN SYRINGE | Refills: 5 | Status: SHIPPED | OUTPATIENT
Start: 2023-03-09

## 2023-03-10 ENCOUNTER — HOSPITAL ENCOUNTER (OUTPATIENT)
Dept: ULTRASOUND IMAGING | Age: 48
Discharge: HOME | End: 2023-03-10
Payer: COMMERCIAL

## 2023-03-10 DIAGNOSIS — R22.1 NECK SWELLING: ICD-10-CM

## 2023-03-10 PROCEDURE — 76536 US EXAM OF HEAD AND NECK: CPT

## 2023-03-15 ENCOUNTER — OFFICE VISIT (OUTPATIENT)
Dept: ORTHOPEDIC SURGERY | Age: 48
End: 2023-03-15

## 2023-03-15 VITALS — OXYGEN SATURATION: 99 % | DIASTOLIC BLOOD PRESSURE: 87 MMHG | SYSTOLIC BLOOD PRESSURE: 132 MMHG | HEART RATE: 81 BPM

## 2023-03-15 DIAGNOSIS — M25.562 CHRONIC PAIN OF BOTH KNEES: Primary | ICD-10-CM

## 2023-03-15 DIAGNOSIS — G89.29 CHRONIC PAIN OF BOTH KNEES: Primary | ICD-10-CM

## 2023-03-15 DIAGNOSIS — M17.12 PRIMARY OSTEOARTHRITIS OF LEFT KNEE: ICD-10-CM

## 2023-03-15 DIAGNOSIS — M17.11 PRIMARY OSTEOARTHRITIS OF RIGHT KNEE: ICD-10-CM

## 2023-03-15 DIAGNOSIS — M25.561 CHRONIC PAIN OF BOTH KNEES: Primary | ICD-10-CM

## 2023-03-15 NOTE — PATIENT INSTRUCTIONS
Continue weight-bearing as tolerated. Continue range of motion exercises as instructed. Ice and elevate as needed. Tylenol or Motrin for pain. Follow up in 1 week.

## 2023-03-15 NOTE — PROGRESS NOTES
VISCO-SUPPLEMENTATION INJECTION (Number 2)    HISTORY OF PRESENT ILLNESS (HPI):   Roddy Rolon is a 49y.o. year old female who is here for follow up for visco-supplementation injection number 2 for   1. Chronic pain of both knees [M25.561, M25.562, G89.29 (ICD-10-CM)]    2. Primary osteoarthritis of right knee [M17.11 (ICD-10-CM)]    3. Primary osteoarthritis of left knee [M17.12 (ICD-10-CM)]    . PAST HISTORY:   Unless otherwise specified in this note, the past history is reviewed today with the patient and is as follows-    Allergies   Allergen Reactions    Hydromorphone Hcl Anaphylaxis    Bupropion Anxiety and Other (See Comments)       Current Outpatient Medications   Medication Sig Dispense Refill    liothyronine (CYTOMEL) 5 MCG tablet Take 1 tablet by mouth daily 30 tablet 5    SYNTHROID 137 MCG tablet Take 1 tablet by mouth Daily 30 tablet 5    Evolocumab (REPATHA SURECLICK) 985 MG/ML SOAJ Inject 140 mg into the skin every 14 days 2 Adjustable Dose Pre-filled Pen Syringe 5    Vitamins-Lipotropics (MULTI-VITAMIN HP/MINERALS PO) Take 1 tablet by mouth daily       No current facility-administered medications for this visit. PHYSICAL EXAM:   /87 (Site: Right Wrist, Position: Sitting)   Pulse 81   SpO2 99%     The right knee is examined:  Inspection:  Skin is intact. No erythema. Palpation:  No swelling or acute tenderness. Neuro/Vascular/Motor:  Sensation to light touch intact. Capillary refill brisk. No focal motor deficits. The left knee is examined:  Inspection:  Skin is intact. No erythema. Palpation:  No swelling or acute tenderness. Neuro/Vascular/Motor:  Sensation to light touch intact. Capillary refill brisk. No focal motor deficits. DIAGNOSIS:     1. Chronic pain of both knees [M25.561, M25.562, G89.29 (ICD-10-CM)]    2. Primary osteoarthritis of right knee [M17.11 (ICD-10-CM)]    3.  Primary osteoarthritis of left knee [M17.12 (ICD-10-CM)]        PROCEDURE:   Right Knee Aspiration / Injection Procedure:  Multiple treatment options were discussed. Joint injection was recommended. Details of the procedure, potential risks, and potential benefits were discussed. Patient's questions were answered. Patient elected to proceed with procedure. Medication: Euflexxa  NDC #: [id number]  Lot #: [id number]  Procedure: Sterile technique was used as the skin over the injection site was prepped with alcohol then the knee joint was then injected through the inferior lateral joint with the above listed medication. A sterile bandage was placed over the injection site. The patient tolerated the procedure well without complication. The patient is scheduled for the third injection in one week. Left Knee Aspiration / Injection Procedure:  Multiple treatment options were discussed. Joint injection was recommended. Details of the procedure, potential risks, and potential benefits were discussed. Patient's questions were answered. Patient elected to proceed with procedure. Medication: Eulfexxa  [facility] #: [id number]  Lot #: [id number]  Procedure: Sterile technique was used as the skin over the injection site was prepped with alcohol then the knee joint was then injected through the inferior lateral joint with the above listed medication. A sterile bandage was placed over the injection site. The patient tolerated the procedure well without complication. The patient is scheduled for the third injection in one week.

## 2023-03-15 NOTE — PROGRESS NOTES
Patient is here for 2nd bilateral knee injection, Euflexxa #2. Patient states she has yet to feel relief for her knee pain. Left knee is worse than right knee. Pain rated 3/10 today. No numbness or tingling.

## 2023-03-22 ENCOUNTER — OFFICE VISIT (OUTPATIENT)
Dept: ORTHOPEDIC SURGERY | Age: 48
End: 2023-03-22
Payer: COMMERCIAL

## 2023-03-22 VITALS
RESPIRATION RATE: 16 BRPM | BODY MASS INDEX: 43.24 KG/M2 | OXYGEN SATURATION: 98 % | HEART RATE: 64 BPM | WEIGHT: 235 LBS | HEIGHT: 62 IN

## 2023-03-22 DIAGNOSIS — M17.12 PRIMARY OSTEOARTHRITIS OF LEFT KNEE: Primary | ICD-10-CM

## 2023-03-22 DIAGNOSIS — M17.11 PRIMARY OSTEOARTHRITIS OF RIGHT KNEE: ICD-10-CM

## 2023-03-22 PROCEDURE — 99999 PR OFFICE/OUTPT VISIT,PROCEDURE ONLY: CPT | Performed by: PHYSICIAN ASSISTANT

## 2023-03-22 PROCEDURE — 20610 DRAIN/INJ JOINT/BURSA W/O US: CPT | Performed by: PHYSICIAN ASSISTANT

## 2023-03-22 NOTE — PROGRESS NOTES
VISCO-SUPPLEMENTATION INJECTION (Number 3)    HISTORY OF PRESENT ILLNESS (HPI):   Catracho Aaron is a 49y.o. year old female who is here for follow up for visco-supplementation injection number 3 for   1. Primary osteoarthritis of left knee [M17.12 (ICD-10-CM)]    2. Primary osteoarthritis of right knee [M17.11 (ICD-10-CM)]    . PAST HISTORY:   Unless otherwise specified in this note, the past history is reviewed today with the patient and is as follows-    Allergies   Allergen Reactions    Hydromorphone Hcl Anaphylaxis    Bupropion Anxiety and Other (See Comments)       Current Outpatient Medications   Medication Sig Dispense Refill    lovastatin (MEVACOR) 20 MG tablet Take 1 tablet by mouth nightly 30 tablet 3    liothyronine (CYTOMEL) 5 MCG tablet Take 1 tablet by mouth daily 30 tablet 5    SYNTHROID 137 MCG tablet Take 1 tablet by mouth Daily 30 tablet 5    Evolocumab (REPATHA SURECLICK) 494 MG/ML SOAJ Inject 140 mg into the skin every 14 days 2 Adjustable Dose Pre-filled Pen Syringe 5    Vitamins-Lipotropics (MULTI-VITAMIN HP/MINERALS PO) Take 1 tablet by mouth daily       No current facility-administered medications for this visit. PHYSICAL EXAM:   Pulse 64   Resp 16   Ht 5' 2\" (1.575 m)   Wt 235 lb (106.6 kg)   SpO2 98%   BMI 42.98 kg/m²     The right knee is examined:  Inspection:  Skin is intact. No erythema. Palpation:  No swelling or acute tenderness. Neuro/Vascular/Motor:  Sensation to light touch intact. Capillary refill brisk. No focal motor deficits. The left knee is examined:  Inspection:  Skin is intact. No erythema. Palpation:  No swelling or acute tenderness. Neuro/Vascular/Motor:  Sensation to light touch intact. Capillary refill brisk. No focal motor deficits. DIAGNOSIS:     1. Primary osteoarthritis of left knee [M17.12 (ICD-10-CM)]    2.  Primary osteoarthritis of right knee [M17.11 (ICD-10-CM)]        PROCEDURE:   Right Knee Aspiration / Injection

## 2023-03-22 NOTE — PATIENT INSTRUCTIONS
Euflexxa # 3  Rest both knees for 24-48 hours  Work on ROM and strengthening of knees and legs   May take NSAIDS or anti-inflammatories as needed  Weightbearing as tolerated  Follow up as needed    We are committed to providing you the best care possible. If you receive a survey after visiting one of our offices, please take time to share your experience concerning your physician office visit. These surveys are confidential and no health information about you is shared. We are eager to improve for you and we are counting on your feedback to help make that happen.

## 2023-05-08 ENCOUNTER — TELEPHONE (OUTPATIENT)
Dept: FAMILY MEDICINE CLINIC | Age: 48
End: 2023-05-08

## 2023-05-08 NOTE — TELEPHONE ENCOUNTER
Pt called today. She has congestion, fever, cough, onset was Friday. She took a covid test today and it was negative. She is asking if you could send something in to help her. [facility] [address] [facility]

## 2023-05-11 RX ORDER — DOXYCYCLINE HYCLATE 100 MG
100 TABLET ORAL 2 TIMES DAILY
Qty: 20 TABLET | Refills: 0 | Status: SHIPPED | OUTPATIENT
Start: 2023-05-11 | End: 2023-05-21

## 2023-05-11 NOTE — TELEPHONE ENCOUNTER
Spoke with pt, she still has her symptoms. She said they are improving some but she is still very congested and has a headache.

## 2023-06-09 ENCOUNTER — OFFICE VISIT (OUTPATIENT)
Dept: FAMILY MEDICINE CLINIC | Age: 48
End: 2023-06-09
Payer: COMMERCIAL

## 2023-06-09 VITALS
OXYGEN SATURATION: 97 % | HEIGHT: 62 IN | HEART RATE: 97 BPM | BODY MASS INDEX: 44.79 KG/M2 | SYSTOLIC BLOOD PRESSURE: 112 MMHG | DIASTOLIC BLOOD PRESSURE: 78 MMHG | WEIGHT: 243.4 LBS

## 2023-06-09 DIAGNOSIS — E78.2 MIXED HYPERLIPIDEMIA: Primary | ICD-10-CM

## 2023-06-09 DIAGNOSIS — E03.9 ACQUIRED HYPOTHYROIDISM: ICD-10-CM

## 2023-06-09 DIAGNOSIS — F41.9 ANXIETY: ICD-10-CM

## 2023-06-09 DIAGNOSIS — E66.01 OBESITY, CLASS III, BMI 40-49.9 (MORBID OBESITY) (HCC): ICD-10-CM

## 2023-06-09 DIAGNOSIS — E78.2 MIXED HYPERLIPIDEMIA: ICD-10-CM

## 2023-06-09 LAB
ALBUMIN SERPL-MCNC: 4.4 G/DL (ref 3.4–5)
ALBUMIN/GLOB SERPL: 1.4 {RATIO} (ref 1.1–2.2)
ALP SERPL-CCNC: 96 U/L (ref 40–129)
ALT SERPL-CCNC: 13 U/L (ref 10–40)
ANION GAP SERPL CALCULATED.3IONS-SCNC: 11 MMOL/L (ref 3–16)
AST SERPL-CCNC: 18 U/L (ref 15–37)
BILIRUB SERPL-MCNC: 0.3 MG/DL (ref 0–1)
BUN SERPL-MCNC: 18 MG/DL (ref 7–20)
CALCIUM SERPL-MCNC: 9.3 MG/DL (ref 8.3–10.6)
CHLORIDE SERPL-SCNC: 99 MMOL/L (ref 99–110)
CO2 SERPL-SCNC: 28 MMOL/L (ref 21–32)
CREAT SERPL-MCNC: 1.1 MG/DL (ref 0.6–1.1)
GFR SERPLBLD CREATININE-BSD FMLA CKD-EPI: >60 ML/MIN/{1.73_M2}
GLUCOSE P FAST SERPL-MCNC: 93 MG/DL (ref 70–99)
POTASSIUM SERPL-SCNC: 3.9 MMOL/L (ref 3.5–5.1)
PROT SERPL-MCNC: 7.6 G/DL (ref 6.4–8.2)
SODIUM SERPL-SCNC: 138 MMOL/L (ref 136–145)
T4 FREE SERPL-MCNC: 1 NG/DL (ref 0.9–1.8)
TSH SERPL DL<=0.005 MIU/L-ACNC: 3.92 UIU/ML (ref 0.27–4.2)

## 2023-06-09 PROCEDURE — G8417 CALC BMI ABV UP PARAM F/U: HCPCS | Performed by: FAMILY MEDICINE

## 2023-06-09 PROCEDURE — 1036F TOBACCO NON-USER: CPT | Performed by: FAMILY MEDICINE

## 2023-06-09 PROCEDURE — 99214 OFFICE O/P EST MOD 30 MIN: CPT | Performed by: FAMILY MEDICINE

## 2023-06-09 PROCEDURE — G8427 DOCREV CUR MEDS BY ELIG CLIN: HCPCS | Performed by: FAMILY MEDICINE

## 2023-06-09 RX ORDER — LOVASTATIN 20 MG/1
20 TABLET ORAL NIGHTLY
Qty: 30 TABLET | Refills: 5 | Status: SHIPPED | OUTPATIENT
Start: 2023-06-09

## 2023-06-09 RX ORDER — HYDROXYZINE PAMOATE 25 MG/1
CAPSULE ORAL
COMMUNITY
Start: 2023-04-20

## 2023-06-09 ASSESSMENT — ENCOUNTER SYMPTOMS
COUGH: 0
SHORTNESS OF BREATH: 0

## 2023-06-09 ASSESSMENT — PATIENT HEALTH QUESTIONNAIRE - PHQ9
8. MOVING OR SPEAKING SO SLOWLY THAT OTHER PEOPLE COULD HAVE NOTICED. OR THE OPPOSITE, BEING SO FIGETY OR RESTLESS THAT YOU HAVE BEEN MOVING AROUND A LOT MORE THAN USUAL: 0
7. TROUBLE CONCENTRATING ON THINGS, SUCH AS READING THE NEWSPAPER OR WATCHING TELEVISION: 0
5. POOR APPETITE OR OVEREATING: 0
10. IF YOU CHECKED OFF ANY PROBLEMS, HOW DIFFICULT HAVE THESE PROBLEMS MADE IT FOR YOU TO DO YOUR WORK, TAKE CARE OF THINGS AT HOME, OR GET ALONG WITH OTHER PEOPLE: 0
2. FEELING DOWN, DEPRESSED OR HOPELESS: 0
6. FEELING BAD ABOUT YOURSELF - OR THAT YOU ARE A FAILURE OR HAVE LET YOURSELF OR YOUR FAMILY DOWN: 0
SUM OF ALL RESPONSES TO PHQ QUESTIONS 1-9: 0
9. THOUGHTS THAT YOU WOULD BE BETTER OFF DEAD, OR OF HURTING YOURSELF: 0
SUM OF ALL RESPONSES TO PHQ QUESTIONS 1-9: 0
4. FEELING TIRED OR HAVING LITTLE ENERGY: 0
SUM OF ALL RESPONSES TO PHQ QUESTIONS 1-9: 0
DEPRESSION UNABLE TO ASSESS: FUNCTIONAL CAPACITY MOTIVATION LIMITS ACCURACY
SUM OF ALL RESPONSES TO PHQ9 QUESTIONS 1 & 2: 0
SUM OF ALL RESPONSES TO PHQ QUESTIONS 1-9: 0
1. LITTLE INTEREST OR PLEASURE IN DOING THINGS: 0
3. TROUBLE FALLING OR STAYING ASLEEP: 0

## 2023-06-09 NOTE — PROGRESS NOTES
Behavior: Behavior normal.       ASSESSMENT/ PLAN:    1. Mixed hyperlipidemia  - will recheck the lipid in another 3 months  - lovastatin (MEVACOR) 20 MG tablet; Take 1 tablet by mouth nightly  Dispense: 30 tablet; Refill: 5    2. Acquired hypothyroidism  - TSH last time on 2/23 was 32.30    3. Anxiety  - stable  - hydrOXYzine pamoate (VISTARIL) 25 MG capsule; TAKE 1 CAPSULE BY MOUTH 3 TIMES DAILY AS NEEDED FOR ANXIETY    4. Obesity, Class III, BMI 40-49.9 (morbid obesity) ([facility] [address]  - encourage wt loss              - All old blood work reviewed with the patient  - Appropriate prescription are addressed. - After visit summery provided. - Questions answered and patient verbalizes understanding.  - Call for any problem, questions, or concerns. No follow-ups on file.

## 2023-08-08 ENCOUNTER — OFFICE VISIT (OUTPATIENT)
Dept: ORTHOPEDIC SURGERY | Age: 48
End: 2023-08-08
Payer: COMMERCIAL

## 2023-08-08 ENCOUNTER — TELEPHONE (OUTPATIENT)
Dept: BARIATRICS/WEIGHT MGMT | Age: 48
End: 2023-08-08

## 2023-08-08 VITALS
RESPIRATION RATE: 15 BRPM | BODY MASS INDEX: 44.35 KG/M2 | HEART RATE: 84 BPM | HEIGHT: 62 IN | WEIGHT: 241 LBS | OXYGEN SATURATION: 97 %

## 2023-08-08 DIAGNOSIS — M17.12 PRIMARY OSTEOARTHRITIS OF LEFT KNEE: Primary | ICD-10-CM

## 2023-08-08 DIAGNOSIS — E66.01 OBESITY, CLASS III, BMI 40-49.9 (MORBID OBESITY) (HCC): ICD-10-CM

## 2023-08-08 DIAGNOSIS — M17.11 PRIMARY OSTEOARTHRITIS OF RIGHT KNEE: ICD-10-CM

## 2023-08-08 DIAGNOSIS — S83.92XA SPRAIN OF LEFT KNEE, UNSPECIFIED LIGAMENT, INITIAL ENCOUNTER: ICD-10-CM

## 2023-08-08 PROCEDURE — 99213 OFFICE O/P EST LOW 20 MIN: CPT | Performed by: ORTHOPAEDIC SURGERY

## 2023-08-08 PROCEDURE — 1036F TOBACCO NON-USER: CPT | Performed by: ORTHOPAEDIC SURGERY

## 2023-08-08 PROCEDURE — G8427 DOCREV CUR MEDS BY ELIG CLIN: HCPCS | Performed by: ORTHOPAEDIC SURGERY

## 2023-08-08 PROCEDURE — G8417 CALC BMI ABV UP PARAM F/U: HCPCS | Performed by: ORTHOPAEDIC SURGERY

## 2023-08-08 ASSESSMENT — ENCOUNTER SYMPTOMS
COLOR CHANGE: 0
CHEST TIGHTNESS: 0
SHORTNESS OF BREATH: 0

## 2023-08-08 NOTE — PROGRESS NOTES
2023   Chief Complaint   Patient presents with    Knee Pain     Bilateral knee Gel injection L>R        History of Present Illness:                             Parrish Thomas is a 49 y.o. female who returns today for follow-up of her bilateral left worse than right knee pain. She continues to have difficulty with aching pain at bilateral knees however left is more severe than right. She has been through steroid injections and gel injections in the past which have only provided some mild temporary pain relief. She still has a significant activity limitations. She has been unable to continue with her workout program because of severe pain at the left knee. She had an episode 2 months ago when she stepped awkwardly with her left leg and felt a twisting injury to the left knee. She had a pop and has had ongoing catching and grinding sensations at the anterior and medial aspect of her knee. She feels occasionally unstable in the knee and has trouble going up and down stairs. Medical History  Patient's medications, allergies, past medical, surgical, social and family histories were reviewed and updated as appropriate.     Past Medical History:   Diagnosis Date    Endometriosis     Endometriosis of pelvis     Goiter 2001    Grave's disease 2000    Pelvic inflammatory disease (PID)     Thyroid disease hypothyroid     Past Surgical History:   Procedure Laterality Date     SECTION      ENDOMETRIAL ABLATION      ENDOMETRIAL ABLATION  2011    TUBAL LIGATION       Family History   Problem Relation Age of Onset    High Blood Pressure Mother     Substance Abuse Mother     Depression Mother     Substance Abuse Father      Social History     Socioeconomic History    Marital status:      Spouse name: None    Number of children: None    Years of education: Associates    Highest education level: None   Occupational History    Occupation: RN   Tobacco Use    Smoking status: Former

## 2023-08-08 NOTE — PATIENT INSTRUCTIONS
Continue weight-bearing as tolerated. Continue range of motion exercises as instructed. Ice and elevate as needed. Tylenol or Motrin for pain. Central scheduling [phone] will be calling you to schedule your MRI. If you do not hear from them with in a week give them a call.     Referral to Dr. Sola Hernandes

## 2023-08-08 NOTE — PROGRESS NOTES
Patient returns to the office today for FU of the bilateral knee pain. Pt states the gel injections did not help with her pain. L>R. Pt states about a month ago she was was walking up the stairs and twisted her left knee and has had increase pain since. She has been wearing a brace which does help with the left knee pain.  Pt states the steroid injection has not helped with her knee pain either in the past.

## 2023-08-08 NOTE — TELEPHONE ENCOUNTER
Bariatric (Obesity - Weight Loss) Surgery  Bariatric (Obesity    Print / Generate a Reference Number  Cost Comparison Across All Benefit Types  Based on your Selected Care Provider information, your network status for this member is currently not available. Please consult your [facility] contract to determine your network status for this member. By default, the member's currently not available benefits are reflected on the Copay/Coinsurance table. YOUR COST IF YOU USE NETWORK BENEFITS  Click to Collapse  Your Cost If You Use Network Benefits Costs  Benefits are not covered. Benefit Details  What is bariatric obesity surgery? Bariatric surgery is weight loss surgery for obesity. It helps lower excessive amounts of body fat that can put you at risk for certain diseases and other health problems. Examples of weight loss surgery are:    Gastric bypass  Laparoscopic adjustable gastric banding (lap-band)  Gastric sleeve  Vertical banded gastroplasty  Biliopancreatic bypass  Need help understanding health care terms? View the glossary for definitions. Limits and Exceptions  General  No coverage for the surgical treatment of obesity.

## 2023-08-21 ENCOUNTER — HOSPITAL ENCOUNTER (OUTPATIENT)
Dept: MRI IMAGING | Age: 48
Discharge: HOME | End: 2023-08-21
Attending: ORTHOPAEDIC SURGERY
Payer: COMMERCIAL

## 2023-08-21 DIAGNOSIS — M17.12 PRIMARY OSTEOARTHRITIS OF LEFT KNEE: ICD-10-CM

## 2023-08-21 DIAGNOSIS — S83.92XA SPRAIN OF LEFT KNEE, UNSPECIFIED LIGAMENT, INITIAL ENCOUNTER: ICD-10-CM

## 2023-08-21 PROCEDURE — 73721 MRI JNT OF LWR EXTRE W/O DYE: CPT

## 2023-09-12 ENCOUNTER — OFFICE VISIT (OUTPATIENT)
Dept: ORTHOPEDIC SURGERY | Age: 48
End: 2023-09-12

## 2023-09-12 VITALS — OXYGEN SATURATION: 99 % | RESPIRATION RATE: 19 BRPM | TEMPERATURE: 98.1 F | HEART RATE: 74 BPM

## 2023-09-12 DIAGNOSIS — M17.12 PRIMARY OSTEOARTHRITIS OF LEFT KNEE: Primary | ICD-10-CM

## 2023-09-12 DIAGNOSIS — M17.11 PRIMARY OSTEOARTHRITIS OF RIGHT KNEE: ICD-10-CM

## 2023-09-12 NOTE — PATIENT INSTRUCTIONS
Continue to weight bear as tolerated  Continue range of motion  Ice and elevate as needed  Tylenol or Motrin for pain  Follow up in 6 weeks to schedule surgery    We are committed to providing you the best care possible. If you receive a survey after visiting one of our offices, please take time to share your experience concerning your physician office visit. These surveys are confidential and no health information about you is shared. We are eager to improve for you and we are counting on your feedback to help make that happen.

## 2023-09-12 NOTE — PROGRESS NOTES
Patient seen in office today for:  Left knee pain, MRI FU    MRI performed 8/21/23, impression below. IMPRESSION:  Tricompartmental osteoarthrosis, most evident in the patellofemoral  compartment, where there is prominent full-thickness cartilage loss. Extrusion of the medial meniscus body without a definite tear. Mucoid degeneration of the ACL, without evidence of a tear. Small knee joint effusion.

## 2023-09-13 ASSESSMENT — ENCOUNTER SYMPTOMS
COLOR CHANGE: 0
VOMITING: 0
EYE REDNESS: 0
WHEEZING: 0
CHEST TIGHTNESS: 0
EYE PAIN: 0
SHORTNESS OF BREATH: 0

## 2023-09-13 NOTE — PROGRESS NOTES
2023   Chief Complaint   Patient presents with    Follow-up    Knee Pain     Left knee pain, MRI FU         History of Present Illness:                             Sue Mccartney is a 49 y.o. female who returns today for follow-up of her bilateral left worse than right knee pain. She feels that injections were not helpful for her. She continues to have progressive worsening of pain dysfunction knees which is bothering her a daily basis and severe limiting her activity level. She has been active in both renals progress made with any additional progress lately because of the constant deep aching pain in bilateral knees which is affecting her ability to be active and exercise        Patient seen in office today for:  Left knee pain, MRI FU       Medical History  Patient's medications, allergies, past medical, surgical, social and family histories were reviewed and updated as appropriate. Past Medical History:   Diagnosis Date    Endometriosis     Endometriosis of pelvis     Goiter 2001    Grave's disease     Pelvic inflammatory disease (PID)     Thyroid disease hypothyroid     Past Surgical History:   Procedure Laterality Date     SECTION      ENDOMETRIAL ABLATION      ENDOMETRIAL ABLATION  2011    TUBAL LIGATION       Family History   Problem Relation Age of Onset    High Blood Pressure Mother     Substance Abuse Mother     Depression Mother     Substance Abuse Father      Social History     Socioeconomic History    Marital status:      Spouse name: None    Number of children: None    Years of education: Associates    Highest education level: None   Occupational History    Occupation: RN   Tobacco Use    Smoking status: Former     Packs/day: 0.25     Years: 18.00     Additional pack years: 0.00     Total pack years: 4.50     Types: Cigarettes     Quit date:      Years since quittin.7    Smokeless tobacco: Never   Substance and Sexual Activity    Alcohol use:  Yes

## 2023-11-15 ENCOUNTER — TELEPHONE (OUTPATIENT)
Dept: BARIATRICS/WEIGHT MGMT | Age: 48
End: 2023-11-15

## 2023-11-15 ENCOUNTER — OFFICE VISIT MH/BH (OUTPATIENT)
Dept: BARIATRICS/WEIGHT MGMT | Age: 48
End: 2023-11-15
Payer: COMMERCIAL

## 2023-11-15 VITALS
SYSTOLIC BLOOD PRESSURE: 104 MMHG | HEIGHT: 64 IN | WEIGHT: 243.1 LBS | HEART RATE: 67 BPM | OXYGEN SATURATION: 96 % | DIASTOLIC BLOOD PRESSURE: 78 MMHG | BODY MASS INDEX: 41.5 KG/M2

## 2023-11-15 DIAGNOSIS — Z79.899 MEDICATION MANAGEMENT: Primary | ICD-10-CM

## 2023-11-15 DIAGNOSIS — E66.01 OBESITY, CLASS III, BMI 40-49.9 (MORBID OBESITY) (HCC): ICD-10-CM

## 2023-11-15 PROCEDURE — 99214 OFFICE O/P EST MOD 30 MIN: CPT | Performed by: NURSE PRACTITIONER

## 2023-11-15 PROCEDURE — G8417 CALC BMI ABV UP PARAM F/U: HCPCS | Performed by: NURSE PRACTITIONER

## 2023-11-15 PROCEDURE — 1036F TOBACCO NON-USER: CPT | Performed by: NURSE PRACTITIONER

## 2023-11-15 PROCEDURE — G8427 DOCREV CUR MEDS BY ELIG CLIN: HCPCS | Performed by: NURSE PRACTITIONER

## 2023-11-15 PROCEDURE — G8484 FLU IMMUNIZE NO ADMIN: HCPCS | Performed by: NURSE PRACTITIONER

## 2023-11-15 NOTE — TELEPHONE ENCOUNTER
Tried calling pt to get her scheduled in with the RD with pt's soonest availability. LVM asking for a return phone call back to the clinic to get the appt scheduled.

## 2023-12-10 NOTE — TELEPHONE ENCOUNTER
Approval scanned into media
Approved : [id number]  Insurance Name - [employer]  Phone # - [phone]  Who did I speak with - Katelyn Hearing  Time and Date - 12:17 PM / 03-  CPT Code -  Orthovisc  Dx Code - M17.11 Left Knee / M17.12 Right Knee  Time frame to inject - 04/05/2021 - 04/05/2022  Was this auth through Medical INS or Drug/Pharm - Medical INS    Transferred - to [phone] - [address]   Who did I speak with - Emilee Catherine  Was this auth through Medical INS or Drug/Pharm - [employer]  Ref # - [id number]
Patient is scheduled
Patient needs to wait a least 4-6 weeks before getting a gel injection
Please advise and please get Bilateral knee gel approved.
Sepsis Criteria were met: